# Patient Record
Sex: FEMALE | Race: WHITE | NOT HISPANIC OR LATINO | URBAN - METROPOLITAN AREA
[De-identification: names, ages, dates, MRNs, and addresses within clinical notes are randomized per-mention and may not be internally consistent; named-entity substitution may affect disease eponyms.]

---

## 2023-08-04 PROBLEM — I63.9 ACUTE CVA (CEREBROVASCULAR ACCIDENT) (HCC): Status: ACTIVE | Noted: 2023-08-04

## 2023-08-05 ENCOUNTER — APPOINTMENT (OUTPATIENT)
Dept: RADIOLOGY | Facility: MEDICAL CENTER | Age: 53
DRG: 065 | End: 2023-08-05
Attending: INTERNAL MEDICINE
Payer: COMMERCIAL

## 2023-08-05 ENCOUNTER — APPOINTMENT (OUTPATIENT)
Dept: RADIOLOGY | Facility: MEDICAL CENTER | Age: 53
DRG: 065 | End: 2023-08-05
Attending: PSYCHIATRY & NEUROLOGY
Payer: COMMERCIAL

## 2023-08-05 ENCOUNTER — HOSPITAL ENCOUNTER (INPATIENT)
Facility: MEDICAL CENTER | Age: 53
LOS: 2 days | DRG: 065 | End: 2023-08-07
Attending: STUDENT IN AN ORGANIZED HEALTH CARE EDUCATION/TRAINING PROGRAM | Admitting: STUDENT IN AN ORGANIZED HEALTH CARE EDUCATION/TRAINING PROGRAM
Payer: COMMERCIAL

## 2023-08-05 DIAGNOSIS — I63.9 ACUTE CVA (CEREBROVASCULAR ACCIDENT) (HCC): ICD-10-CM

## 2023-08-05 PROBLEM — E66.9 OBESITY: Status: ACTIVE | Noted: 2023-08-05

## 2023-08-05 PROBLEM — E11.9 TYPE 2 DIABETES MELLITUS (HCC): Status: ACTIVE | Noted: 2023-08-05

## 2023-08-05 PROBLEM — I10 HYPERTENSION: Status: ACTIVE | Noted: 2023-08-05

## 2023-08-05 PROBLEM — E78.5 HYPERLIPIDEMIA: Status: ACTIVE | Noted: 2023-08-05

## 2023-08-05 LAB
AMPHET UR QL SCN: NEGATIVE
ANION GAP SERPL CALC-SCNC: 11 MMOL/L (ref 7–16)
BARBITURATES UR QL SCN: NEGATIVE
BASOPHILS # BLD AUTO: 0.7 % (ref 0–1.8)
BASOPHILS # BLD: 0.07 K/UL (ref 0–0.12)
BENZODIAZ UR QL SCN: NEGATIVE
BUN SERPL-MCNC: 12 MG/DL (ref 8–22)
BZE UR QL SCN: NEGATIVE
CALCIUM SERPL-MCNC: 8.7 MG/DL (ref 8.5–10.5)
CANNABINOIDS UR QL SCN: NEGATIVE
CHLORIDE SERPL-SCNC: 97 MMOL/L (ref 96–112)
CHOLEST SERPL-MCNC: 113 MG/DL (ref 100–199)
CO2 SERPL-SCNC: 28 MMOL/L (ref 20–33)
CREAT SERPL-MCNC: 0.61 MG/DL (ref 0.5–1.4)
EOSINOPHIL # BLD AUTO: 0.22 K/UL (ref 0–0.51)
EOSINOPHIL NFR BLD: 2.2 % (ref 0–6.9)
ERYTHROCYTE [DISTWIDTH] IN BLOOD BY AUTOMATED COUNT: 40.5 FL (ref 35.9–50)
EST. AVERAGE GLUCOSE BLD GHB EST-MCNC: 258 MG/DL
ETHANOL BLD-MCNC: <10.1 MG/DL
FENTANYL UR QL: NEGATIVE
GFR SERPLBLD CREATININE-BSD FMLA CKD-EPI: 107 ML/MIN/1.73 M 2
GLUCOSE BLD STRIP.AUTO-MCNC: 255 MG/DL (ref 65–99)
GLUCOSE BLD STRIP.AUTO-MCNC: 293 MG/DL (ref 65–99)
GLUCOSE BLD STRIP.AUTO-MCNC: 362 MG/DL (ref 65–99)
GLUCOSE BLD STRIP.AUTO-MCNC: 406 MG/DL (ref 65–99)
GLUCOSE SERPL-MCNC: 247 MG/DL (ref 65–99)
HBA1C MFR BLD: 10.6 % (ref 4–5.6)
HCT VFR BLD AUTO: 39.4 % (ref 37–47)
HDLC SERPL-MCNC: 42 MG/DL
HGB BLD-MCNC: 13.7 G/DL (ref 12–16)
IMM GRANULOCYTES # BLD AUTO: 0.03 K/UL (ref 0–0.11)
IMM GRANULOCYTES NFR BLD AUTO: 0.3 % (ref 0–0.9)
LDLC SERPL CALC-MCNC: 55 MG/DL
LYMPHOCYTES # BLD AUTO: 2.9 K/UL (ref 1–4.8)
LYMPHOCYTES NFR BLD: 28.7 % (ref 22–41)
MCH RBC QN AUTO: 30 PG (ref 27–33)
MCHC RBC AUTO-ENTMCNC: 34.8 G/DL (ref 32.2–35.5)
MCV RBC AUTO: 86.2 FL (ref 81.4–97.8)
METHADONE UR QL SCN: NEGATIVE
MONOCYTES # BLD AUTO: 0.68 K/UL (ref 0–0.85)
MONOCYTES NFR BLD AUTO: 6.7 % (ref 0–13.4)
NEUTROPHILS # BLD AUTO: 6.21 K/UL (ref 1.82–7.42)
NEUTROPHILS NFR BLD: 61.4 % (ref 44–72)
NRBC # BLD AUTO: 0 K/UL
NRBC BLD-RTO: 0 /100 WBC (ref 0–0.2)
OPIATES UR QL SCN: NEGATIVE
OXYCODONE UR QL SCN: NEGATIVE
PCP UR QL SCN: NEGATIVE
PLATELET # BLD AUTO: 249 K/UL (ref 164–446)
PMV BLD AUTO: 9.9 FL (ref 9–12.9)
POTASSIUM SERPL-SCNC: 3.4 MMOL/L (ref 3.6–5.5)
PROPOXYPH UR QL SCN: NEGATIVE
RBC # BLD AUTO: 4.57 M/UL (ref 4.2–5.4)
SODIUM SERPL-SCNC: 136 MMOL/L (ref 135–145)
TRIGL SERPL-MCNC: 79 MG/DL (ref 0–149)
WBC # BLD AUTO: 10.1 K/UL (ref 4.8–10.8)

## 2023-08-05 PROCEDURE — A9270 NON-COVERED ITEM OR SERVICE: HCPCS | Performed by: INTERNAL MEDICINE

## 2023-08-05 PROCEDURE — 99222 1ST HOSP IP/OBS MODERATE 55: CPT | Performed by: PSYCHIATRY & NEUROLOGY

## 2023-08-05 PROCEDURE — A9270 NON-COVERED ITEM OR SERVICE: HCPCS | Performed by: NURSE PRACTITIONER

## 2023-08-05 PROCEDURE — 80061 LIPID PANEL: CPT

## 2023-08-05 PROCEDURE — 82077 ASSAY SPEC XCP UR&BREATH IA: CPT

## 2023-08-05 PROCEDURE — 82962 GLUCOSE BLOOD TEST: CPT | Mod: 91

## 2023-08-05 PROCEDURE — 700102 HCHG RX REV CODE 250 W/ 637 OVERRIDE(OP): Performed by: INTERNAL MEDICINE

## 2023-08-05 PROCEDURE — 70551 MRI BRAIN STEM W/O DYE: CPT

## 2023-08-05 PROCEDURE — 770020 HCHG ROOM/CARE - TELE (206)

## 2023-08-05 PROCEDURE — 700102 HCHG RX REV CODE 250 W/ 637 OVERRIDE(OP): Performed by: NURSE PRACTITIONER

## 2023-08-05 PROCEDURE — 80048 BASIC METABOLIC PNL TOTAL CA: CPT

## 2023-08-05 PROCEDURE — 80307 DRUG TEST PRSMV CHEM ANLYZR: CPT

## 2023-08-05 PROCEDURE — 85025 COMPLETE CBC W/AUTO DIFF WBC: CPT

## 2023-08-05 PROCEDURE — 92610 EVALUATE SWALLOWING FUNCTION: CPT

## 2023-08-05 PROCEDURE — 99291 CRITICAL CARE FIRST HOUR: CPT | Performed by: INTERNAL MEDICINE

## 2023-08-05 PROCEDURE — 70544 MR ANGIOGRAPHY HEAD W/O DYE: CPT

## 2023-08-05 PROCEDURE — 700111 HCHG RX REV CODE 636 W/ 250 OVERRIDE (IP): Mod: JZ | Performed by: INTERNAL MEDICINE

## 2023-08-05 PROCEDURE — 70547 MR ANGIOGRAPHY NECK W/O DYE: CPT

## 2023-08-05 PROCEDURE — 83036 HEMOGLOBIN GLYCOSYLATED A1C: CPT

## 2023-08-05 PROCEDURE — A9270 NON-COVERED ITEM OR SERVICE: HCPCS | Mod: JZ | Performed by: INTERNAL MEDICINE

## 2023-08-05 RX ORDER — PROCHLORPERAZINE EDISYLATE 5 MG/ML
5-10 INJECTION INTRAMUSCULAR; INTRAVENOUS EVERY 4 HOURS PRN
Status: DISCONTINUED | OUTPATIENT
Start: 2023-08-05 | End: 2023-08-07 | Stop reason: HOSPADM

## 2023-08-05 RX ORDER — LABETALOL HYDROCHLORIDE 5 MG/ML
10 INJECTION, SOLUTION INTRAVENOUS
Status: DISCONTINUED | OUTPATIENT
Start: 2023-08-05 | End: 2023-08-07 | Stop reason: HOSPADM

## 2023-08-05 RX ORDER — POLYETHYLENE GLYCOL 3350 17 G/17G
1 POWDER, FOR SOLUTION ORAL
Status: DISCONTINUED | OUTPATIENT
Start: 2023-08-05 | End: 2023-08-07 | Stop reason: HOSPADM

## 2023-08-05 RX ORDER — TELMISARTAN 80 MG/1
1 TABLET ORAL DAILY
COMMUNITY

## 2023-08-05 RX ORDER — BISACODYL 10 MG
10 SUPPOSITORY, RECTAL RECTAL
Status: DISCONTINUED | OUTPATIENT
Start: 2023-08-05 | End: 2023-08-07 | Stop reason: HOSPADM

## 2023-08-05 RX ORDER — ATORVASTATIN CALCIUM 80 MG/1
80 TABLET, FILM COATED ORAL EVERY EVENING
Status: DISCONTINUED | OUTPATIENT
Start: 2023-08-05 | End: 2023-08-07 | Stop reason: HOSPADM

## 2023-08-05 RX ORDER — AMOXICILLIN 250 MG
2 CAPSULE ORAL 2 TIMES DAILY
Status: DISCONTINUED | OUTPATIENT
Start: 2023-08-05 | End: 2023-08-07 | Stop reason: HOSPADM

## 2023-08-05 RX ORDER — ACETAMINOPHEN 325 MG/1
650 TABLET ORAL EVERY 6 HOURS PRN
Status: DISCONTINUED | OUTPATIENT
Start: 2023-08-05 | End: 2023-08-07 | Stop reason: HOSPADM

## 2023-08-05 RX ORDER — ASPIRIN 81 MG/1
81 TABLET ORAL DAILY
Status: DISCONTINUED | OUTPATIENT
Start: 2023-08-05 | End: 2023-08-07 | Stop reason: HOSPADM

## 2023-08-05 RX ORDER — PROMETHAZINE HYDROCHLORIDE 25 MG/1
12.5-25 SUPPOSITORY RECTAL EVERY 4 HOURS PRN
Status: DISCONTINUED | OUTPATIENT
Start: 2023-08-05 | End: 2023-08-07 | Stop reason: HOSPADM

## 2023-08-05 RX ORDER — VITAMIN B COMPLEX
2000 TABLET ORAL DAILY
COMMUNITY

## 2023-08-05 RX ORDER — GLIMEPIRIDE 4 MG/1
4 TABLET ORAL
Status: DISCONTINUED | OUTPATIENT
Start: 2023-08-06 | End: 2023-08-07 | Stop reason: HOSPADM

## 2023-08-05 RX ORDER — DEXTROSE MONOHYDRATE 25 G/50ML
25 INJECTION, SOLUTION INTRAVENOUS
Status: DISCONTINUED | OUTPATIENT
Start: 2023-08-05 | End: 2023-08-06

## 2023-08-05 RX ORDER — HYDRALAZINE HYDROCHLORIDE 20 MG/ML
10 INJECTION INTRAMUSCULAR; INTRAVENOUS
Status: DISCONTINUED | OUTPATIENT
Start: 2023-08-05 | End: 2023-08-07 | Stop reason: HOSPADM

## 2023-08-05 RX ORDER — PROMETHAZINE HYDROCHLORIDE 25 MG/1
12.5-25 TABLET ORAL EVERY 4 HOURS PRN
Status: DISCONTINUED | OUTPATIENT
Start: 2023-08-05 | End: 2023-08-07 | Stop reason: HOSPADM

## 2023-08-05 RX ORDER — ENOXAPARIN SODIUM 100 MG/ML
40 INJECTION SUBCUTANEOUS DAILY
Status: DISCONTINUED | OUTPATIENT
Start: 2023-08-05 | End: 2023-08-07 | Stop reason: HOSPADM

## 2023-08-05 RX ORDER — ALPRAZOLAM 1 MG/1
1 TABLET ORAL ONCE
Status: COMPLETED | OUTPATIENT
Start: 2023-08-05 | End: 2023-08-05

## 2023-08-05 RX ORDER — ONDANSETRON 2 MG/ML
4 INJECTION INTRAMUSCULAR; INTRAVENOUS EVERY 4 HOURS PRN
Status: DISCONTINUED | OUTPATIENT
Start: 2023-08-05 | End: 2023-08-07 | Stop reason: HOSPADM

## 2023-08-05 RX ORDER — LEVOTHYROXINE SODIUM 175 UG/1
1 TABLET ORAL
COMMUNITY

## 2023-08-05 RX ORDER — POTASSIUM CHLORIDE 20 MEQ/1
40 TABLET, EXTENDED RELEASE ORAL ONCE
Status: COMPLETED | OUTPATIENT
Start: 2023-08-05 | End: 2023-08-05

## 2023-08-05 RX ORDER — ONDANSETRON 4 MG/1
4 TABLET, ORALLY DISINTEGRATING ORAL EVERY 4 HOURS PRN
Status: DISCONTINUED | OUTPATIENT
Start: 2023-08-05 | End: 2023-08-07 | Stop reason: HOSPADM

## 2023-08-05 RX ADMIN — ENOXAPARIN SODIUM 40 MG: 100 INJECTION SUBCUTANEOUS at 18:00

## 2023-08-05 RX ADMIN — INSULIN HUMAN 5 UNITS: 100 INJECTION, SOLUTION PARENTERAL at 14:30

## 2023-08-05 RX ADMIN — SENNOSIDES AND DOCUSATE SODIUM 2 TABLET: 50; 8.6 TABLET ORAL at 18:00

## 2023-08-05 RX ADMIN — ATORVASTATIN CALCIUM 80 MG: 80 TABLET, FILM COATED ORAL at 17:59

## 2023-08-05 RX ADMIN — POTASSIUM CHLORIDE 40 MEQ: 1500 TABLET, EXTENDED RELEASE ORAL at 09:57

## 2023-08-05 RX ADMIN — ASPIRIN 81 MG: 81 TABLET, COATED ORAL at 18:00

## 2023-08-05 RX ADMIN — INSULIN HUMAN 9 UNITS: 100 INJECTION, SOLUTION PARENTERAL at 18:10

## 2023-08-05 RX ADMIN — ALPRAZOLAM 1 MG: 1 TABLET ORAL at 12:01

## 2023-08-05 RX ADMIN — INSULIN GLARGINE-YFGN 40 UNITS: 100 INJECTION, SOLUTION SUBCUTANEOUS at 18:52

## 2023-08-05 RX ADMIN — INSULIN HUMAN 5 UNITS: 100 INJECTION, SOLUTION PARENTERAL at 05:26

## 2023-08-05 ASSESSMENT — COGNITIVE AND FUNCTIONAL STATUS - GENERAL
SUGGESTED CMS G CODE MODIFIER MOBILITY: CH
MOBILITY SCORE: 24
SUGGESTED CMS G CODE MODIFIER DAILY ACTIVITY: CH
DAILY ACTIVITIY SCORE: 24

## 2023-08-05 ASSESSMENT — LIFESTYLE VARIABLES
CONSUMPTION TOTAL: NEGATIVE
TOTAL SCORE: 0
HOW MANY TIMES IN THE PAST YEAR HAVE YOU HAD 5 OR MORE DRINKS IN A DAY: 0
AVERAGE NUMBER OF DAYS PER WEEK YOU HAVE A DRINK CONTAINING ALCOHOL: 0
DOES PATIENT WANT TO STOP DRINKING: NO
TOTAL SCORE: 0
EVER HAD A DRINK FIRST THING IN THE MORNING TO STEADY YOUR NERVES TO GET RID OF A HANGOVER: NO
ALCOHOL_USE: NO
HAVE PEOPLE ANNOYED YOU BY CRITICIZING YOUR DRINKING: NO
EVER FELT BAD OR GUILTY ABOUT YOUR DRINKING: NO
TOTAL SCORE: 0
HAVE YOU EVER FELT YOU SHOULD CUT DOWN ON YOUR DRINKING: NO
ON A TYPICAL DAY WHEN YOU DRINK ALCOHOL HOW MANY DRINKS DO YOU HAVE: 0

## 2023-08-05 ASSESSMENT — PAIN DESCRIPTION - PAIN TYPE
TYPE: ACUTE PAIN

## 2023-08-05 ASSESSMENT — PATIENT HEALTH QUESTIONNAIRE - PHQ9
SUM OF ALL RESPONSES TO PHQ9 QUESTIONS 1 AND 2: 0
2. FEELING DOWN, DEPRESSED, IRRITABLE, OR HOPELESS: NOT AT ALL
1. LITTLE INTEREST OR PLEASURE IN DOING THINGS: NOT AT ALL

## 2023-08-05 ASSESSMENT — ENCOUNTER SYMPTOMS
SENSORY CHANGE: 1
STRIDOR: 0
SHORTNESS OF BREATH: 0
VOMITING: 0
BLURRED VISION: 1
MYALGIAS: 0
FOCAL WEAKNESS: 1
DIZZINESS: 0
COUGH: 0
NAUSEA: 0
FEVER: 0
HEADACHES: 1
ABDOMINAL PAIN: 0
CHILLS: 0
SPUTUM PRODUCTION: 0

## 2023-08-05 NOTE — THERAPY
Speech Language Pathology   Clinical Swallow Evaluation     Patient Name: Robyn Schaefer  AGE:  53 y.o., SEX:  female  Medical Record #: [id number]  Date of Service: 8/5/2023      History of Present Illness  52 y/o F admitted 8/5/23 with R sided weakness.  A noncontrast CT of the head was obtained which showed no hemorrhage however a CTA was unable to be obtained due to her allergy to contrast.  Telemetry neurology was contacted and gave her an NIH stroke scale of 4 for right upper and lower extremity weakness, right hand ataxia and sensation changes on the right.  She was given TNK and transferred to our facility for poststroke care.  At this time the patient states her sensation changes have resolved as has her discoordination of the right hand and right lower extremity weakness.    PMHx: HTN, HLD, DM-II.     General Information:  Vitals  O2 (LPM): 2  O2 Delivery Device: Silicone Nasal Cannula  Level of Consciousness: Alert  Patient Behaviors:  (Calm, cooperative)  Orientation: Oriented x 4  Follows Directives: Yes    Prior Living Situation & Level of Function:  Prior Services: Home-Independent  Communication: WFL  Swallowing: WFL     Oral Mechanism Evaluation:  Dentition: Good   Facial Symmetry: Equal  Facial Sensation: Equal     Labial Observations: WFL   Lingual Observations: Midline  Motor Speech: WFL       Laryngeal Function:  Secretion Management: Adequate  Voice Quality: WFL  Cough: Perceptually WNL    Subjective  Patient reports her R hand movement has improved. She states she had been having some word finding issues over the last couple months but nothing significant during this stroke-like episode. She denies any difficulty swallowing.    Assessment  Current Method of Nutrition: NPO until cleared by speech pathology  Positioning: Nguyen's (60-90 degrees)  Bolus Administration: Patient  O2 (LPM): 2 O2 Delivery Device: Silicone Nasal Cannula  Factor(s) Affecting Performance: None     Swallowing  Trials:  Swallowing Trials  Thin Liquid (TN0): WFL  Pureed (PU4): WFL  Regular (RG7): WFL    Comments: Intact oral containment, mastication, bolus formation and oral clearance. Pharyngeal swallow response appeared timely. No s/sx of pharyngeal inefficiency or airway invasion. Patient denied any difficulty swallowing.     Clinical Impressions  Intact oropharyngeal swallow. Diet modification is not indicated. No further SLP intervention is indicated for swallowing. Will f/u to determine if formal speech/cog eval is indicated based on MRI results, though pt appears cognitively intact based on informal observations.     Recommendations  Diet Consistency: Regular/thin  Instrumentation: None indicated at this time  Medication: Whole with liquid  Supervision: Independent  Positioning: Fully upright and midline during oral intake  Risk Management : None  Oral Care: BID    SLP Treatment Plan  Treatment Plan: None Indicated  SLP Frequency: N/A - Evaluation Only  Estimated Duration: N/A - Evaluation Only    Anticipated Discharge Needs  Discharge Recommendations: Anticipate that the patient will have no further speech therapy needs after discharge from the hospital   Therapy Recommendations Upon DC: Not Indicated     Flor Manzano MS,CCC-SLP

## 2023-08-05 NOTE — DISCHARGE PLANNING
[facility] Transitional Care Coordination    Referral from: Dr. Ronnie Guzmán Jr  Insurance Provider on Facesheet:  No insurance noted at this time  Potential Rehab diagnosis:  Stroke    Chart review indicates patient has ongoing medical management and may have therapy needs to possibly meet inpatient rehab facility criteria with the goal of returning to community.      D/C Support: TBD     Physiatry consult pended, waiting for additional information.  Right hand weakness,  right visual changes.  Received TNK.  Workup ongoing - CT head, MR brain pending.  PT/OT pending as clinically appropriate.  TCC will follow.  Please reach out sooner if PMR consult requested for medical management.     Thank you for the referral.

## 2023-08-05 NOTE — H&P
"Critical Care H&P    Date of consult: 8/5/2023    Referring Physician  Maico Chavez M.D.    Reason for Consultation  Right-sided weakness    History of Presenting Illness  53 y.o. female with past medical history of hypertension, hyperlipidemia, poorly controlled type 2 diabetes who presented 8/5/2023 as a transfer from [facility] where she presented with right hand weakness.  The patient states she got into her car and was unable to  the stick shift.  At that time (1530) she noted a frontal headache and right visual field blurriness, \"like water\".  She drove herself home then decided to go to the ER, prior to arrival in the ER her headache and visual symptoms resolved however she was having ongoing right hand weakness.  A noncontrast CT of the head was obtained which showed no hemorrhage however a CTA was unable to be obtained due to her allergy to contrast.  Telemetry neurology was contacted and gave her an NIH stroke scale of 4 for right upper and lower extremity weakness, right hand ataxia and sensation changes on the right.  She was given TNK and transferred to our facility for poststroke care.  At this time the patient states her sensation changes have resolved as has her discoordination of the right hand and right lower extremity weakness.    She denies any history of migraine, stroke, TIA or irregular heart rhythm.    Code Status  Full Code    Review of Systems  Review of Systems   Constitutional:  Negative for chills and fever.   Eyes:  Positive for blurred vision.   Respiratory:  Negative for cough, sputum production, shortness of breath and stridor.    Cardiovascular:  Positive for chest pain (On and off for months, currently chest pain-free).   Gastrointestinal:  Negative for abdominal pain, nausea and vomiting.   Genitourinary:  Negative for dysuria.   Musculoskeletal:  Negative for myalgias.   Skin:  Negative for rash.   Neurological:  Positive for sensory change, focal " weakness and headaches. Negative for dizziness.       Past Medical History  HTN, HLP, type 2 diabetes    Surgical History   has no past surgical history on file.    Family History  family history is not on file.    Social History  Quit tobacco 15 yrs ago, quit meth 20 years ago, no EtOH    Medications  Home Medications    **Home medications have not yet been reviewed for this encounter**       Current Facility-Administered Medications   Medication Dose Route Frequency Provider Last Rate Last Admin    senna-docusate (Pericolace Or Senokot S) 8.6-50 MG per tablet 2 Tablet  2 Tablet Oral BID Ronnie Guzmán Jr., D.O.        And    polyethylene glycol/lytes (Miralax) PACKET 1 Packet  1 Packet Oral QDAY PRN Ronnie Guzmán Jr., D.O.        And    magnesium hydroxide (Milk Of Magnesia) suspension 30 mL  30 mL Oral QDAY PRN Ronnie Guzmán Jr., D.O.        And    bisacodyl (Dulcolax) suppository 10 mg  10 mg Rectal QDAY PRN Ronnie Guzmán Jr., D.O.        acetaminophen (Tylenol) tablet 650 mg  650 mg Oral Q6HRS PRN Ronnie Guzmán Jr., D.O.        ondansetron (Zofran) syringe/vial injection 4 mg  4 mg Intravenous Q4HRS PRN Ronnie Guzmán Jr., D.O.        ondansetron (Zofran ODT) dispertab 4 mg  4 mg Oral Q4HRS PRN Ronnie Guzmán Jr., D.O.        promethazine (Phenergan) tablet 12.5-25 mg  12.5-25 mg Oral Q4HRS PRN Ronnie Guzmán Jr., D.O.        promethazine (Phenergan) suppository 12.5-25 mg  12.5-25 mg Rectal Q4HRS PRN Ronnie Guzmán Jr., D.O.        prochlorperazine (Compazine) injection 5-10 mg  5-10 mg Intravenous Q4HRS PRN Ronnie Guzmán Jr., D.O.        insulin regular (HumuLIN R,NovoLIN R) injection  2-9 Units Subcutaneous Q6HRS Ronnie Guzmán Jr., D.O.        And    dextrose 50% (D50W) injection 25 g  25 g Intravenous Q15 MIN PRN Ronnie Guzmán Jr., D.O.        labetalol (Normodyne/Trandate) injection 10 mg  10 mg Intravenous Q10 MIN PRN Ronnie Guzmán Jr., D.O.        hydrALAZINE (Apresoline) injection  10 mg  10 mg Intravenous Q2HRS PRN ADRIENNE Shaw Jr.O.        niCARdipine (Cardene) 25 mg in  mL Standard Infusion  0-15 mg/hr Intravenous Continuous Ronnie Guzmán Jr., D.O.        atorvastatin (Lipitor) tablet 80 mg  80 mg Oral Q EVENING DUONG Shaw Jr..GINGER.           Allergies  No Known Allergies    Vital Signs last 24 hours       Physical Exam  Physical Exam  Vitals and nursing note reviewed.   Constitutional:       Appearance: She is obese. She is ill-appearing.   HENT:      Head: Normocephalic and atraumatic.      Right Ear: External ear normal.      Left Ear: External ear normal.      Nose: Nose normal.      Mouth/Throat:      Mouth: Mucous membranes are moist.      Pharynx: Oropharynx is clear.   Eyes:      Extraocular Movements: Extraocular movements intact.      Conjunctiva/sclera: Conjunctivae normal.      Pupils: Pupils are equal, round, and reactive to light.   Cardiovascular:      Rate and Rhythm: Normal rate and regular rhythm.      Pulses: Normal pulses.   Pulmonary:      Effort: Pulmonary effort is normal.      Breath sounds: Normal breath sounds.   Abdominal:      General: Bowel sounds are normal.      Palpations: Abdomen is soft.   Musculoskeletal:         General: Normal range of motion.      Cervical back: Neck supple.   Skin:     General: Skin is warm and dry.      Capillary Refill: Capillary refill takes less than 2 seconds.   Neurological:      Mental Status: She is alert.      Cranial Nerves: No cranial nerve deficit.      Sensory: No sensory deficit.      Motor: Weakness present.      Comments: 4+ out of 5 right upper extremity strength.  All others 5 out of 5.  Intact sensation and cranial nerves.   Psychiatric:         Mood and Affect: Mood normal.         Behavior: Behavior normal.         Fluids  No intake or output data in the 24 hours ending 08/05/23 0214    Laboratory  No results found for this or any previous visit (from the past 48 hour(s)).    Imaging  No orders  to display       Assessment/Plan  * Acute CVA (cerebrovascular accident) (HCC)- (present on admission)  Assessment & Plan  Differential diagnosis includes complex migraine, TIA versus CVA  NIH improved post TNK  Neurology consulted  Admit to ICU, cardiac monitoring  Neurology consult  Neuro checks per protocol  MRI, Echo pending  seizure, aspiration, and fall precautions   NPO pending dysphagia screen  SLP/PT/OT evaluation  lipid panel, HbA1c, Troponin, Coags, routine labs  ASA/Heparin 24 hours post tPA, Atorvastatin  Maintain eunatremia, euglycemia, normothermia and normotension  Goal SBP <180 mmHg; hydralazine, labetalol nicardipine as needed    Obesity- (present on admission)  Assessment & Plan  Weight loss counseling prior to discharge    Hypertension- (present on admission)  Assessment & Plan  Goal systolic blood pressure less than 180  As needed medications available  Restart home meds when swallow evaluation passed    Hyperlipidemia- (present on admission)  Assessment & Plan  High intensity statin  Check lipid panel    Type 2 diabetes mellitus (HCC)- (present on admission)  Assessment & Plan  Hold home metformin  Goal blood glucose 140-180  sliding scale insulin, accuchecks  hypoglycemia protocol  Check A1c          Discussed patient condition and risk of morbidity and/or mortality with RN, RT, Pharmacy, Code status disscussed, Charge nurse / hot rounds, and Patient.      The patient remains critically ill.  Critical care time = 35 minutes in directly providing and coordinating critical care and extensive data review.  No time overlap and excludes procedures.    Please note that this dictation was created using voice recognition software. The accuracy of the dictation is limited to the abilities of the software. I have made every reasonable attempt to correct obvious errors, but I expect that there are errors of grammar and possibly content that I did not discover before finalizing the note.

## 2023-08-05 NOTE — PROGRESS NOTES
Late Entry:  Dr. Guzmán at the bedside, plan of care discussed admission, and post TNK orders orders received.

## 2023-08-05 NOTE — CONSULTS
Neurology STROKE CODE H&P  Neurohospitalist Service, [facility]    Referring Physician: Maico Chavez M.D.    STROKE CODE: No chief complaint on file.      To obtain the most accurate data regarding the time called, and time patient seen, refer to the stroke run-sheet and chart.  For time of CT, refer to the radiology report. See A&P below for TPA Decision and door to needle time if and when applicable.    HPI: 53-year-old female with a past medical history of hyperlipidemia, diabetes presented to outside facility with a cute onset of right arm hand numbness and weakness and clumsiness.  Episode of visual changes.  The time she arrived to the facility visual changes resolved however there right arm changes persisted.  Telemetry neurology at that facility was consulted and TNK was given.  TNK was given at 1740.  Onset was 1530.  Patient now says most of her weakness has resolved still has slight numbness in her right hand.  NIH reportedly was 4 outside facility      Review of systems: In addition to what is detailed in the HPI above, (and scanned into the chart if and when applicable), all other systems reviewed and are negative.    Past Medical History:   Diabetes and hyperlipidemia  FHx:  No early strokes  SHx:       Allergies:  No Known Allergies    Medications:    Current Facility-Administered Medications:     senna-docusate (Pericolace Or Senokot S) 8.6-50 MG per tablet 2 Tablet, 2 Tablet, Oral, BID **AND** polyethylene glycol/lytes (Miralax) PACKET 1 Packet, 1 Packet, Oral, QDAY PRN **AND** magnesium hydroxide (Milk Of Magnesia) suspension 30 mL, 30 mL, Oral, QDAY PRN **AND** bisacodyl (Dulcolax) suppository 10 mg, 10 mg, Rectal, QDAY PRN, Ronnie Guzmán Jr., D.O.    acetaminophen (Tylenol) tablet 650 mg, 650 mg, Oral, Q6HRS PRN, Ronnie Guzmán Jr., D.O.    ondansetron (Zofran) syringe/vial injection 4 mg, 4 mg, Intravenous, Q4HRS PRN, Ronnie Guzmán Jr., D.O.    ondansetron (Zofran  ODT) dispertab 4 mg, 4 mg, Oral, Q4HRS PRN, DUONG Shaw Jr..OAlexia    promethazine (Phenergan) tablet 12.5-25 mg, 12.5-25 mg, Oral, Q4HRS PRN, DUONG Shaw Jr..O.    promethazine (Phenergan) suppository 12.5-25 mg, 12.5-25 mg, Rectal, Q4HRS PRN, DUONG Shaw Jr..O.    prochlorperazine (Compazine) injection 5-10 mg, 5-10 mg, Intravenous, Q4HRS PRN, DUONG Shaw Jr..O.    insulin regular (HumuLIN R,NovoLIN R) injection, 2-9 Units, Subcutaneous, Q6HRS, 5 Units at 08/05/23 0526 **AND** POC blood glucose manual result, , , Q6H **AND** NOTIFY MD and PharmD, , , Once **AND** Administer 20 grams of glucose (approximately 8 ounces of fruit juice) every 15 minutes PRN FSBG less than 70 mg/dL, , , PRN **AND** dextrose 50% (D50W) injection 25 g, 25 g, Intravenous, Q15 MIN PRN, DUONG Shaw Jr..O.    labetalol (Normodyne/Trandate) injection 10 mg, 10 mg, Intravenous, Q10 MIN PRN, DUONG Shaw Jr..O.    hydrALAZINE (Apresoline) injection 10 mg, 10 mg, Intravenous, Q2HRS PRN, DUONG Shaw Jr..O.    niCARdipine (Cardene) 25 mg in  mL Standard Infusion, 0-15 mg/hr, Intravenous, Continuous, DUONG Shaw Jr..O., Dose not Required at 08/05/23 0230    atorvastatin (Lipitor) tablet 80 mg, 80 mg, Oral, Q EVENING, Ronnie Guzmán Jr. D.O.    Physical Examination:    Vitals:    08/05/23 0400 08/05/23 0500 08/05/23 0600 08/05/23 0700   BP: 125/59 126/65 125/62 108/59   Pulse: 73 74 70 71   Resp: 15 13 14 13   Temp: 36.3 °C (97.4 °F) 36.4 °C (97.6 °F) 36.4 °C (97.6 °F)    TempSrc: Temporal      SpO2: 96% 97% 96% 96%   Weight:       Height:           General: Patient is awake and in no acute distress  Eyes: Cannot visualize fundus CV: RRR    NEUROLOGICAL EXAM:     Mental status: Awake, alert and fully oriented, follows commands  Speech and language: speech is clear and fluent. The patient is able to name and repeat.  Cranial nerve exam: Pupils are equal, round and reactive to light  bilaterally. Visual fields are full. Extraocular muscles are intact. Sensation in the face is intact to light touch. Face is symmetric. Hearing intact. Palate elevates symmetrically. Shoulder shrug is full. Tongue is midline.  Motor exam: Sustained antigravity and in all 4.  Slight right hand  weakness 4 out of 5..  Sensory exam: Slight decreased to soft touch in the right hand compared to left.  Deep tendon reflexes: Toes are downgoing bilaterally coordination: no ataxia   Gait: deferred due to being on bedbound orders after TNK    NIH Stroke Scale:    1a. Level of Consciousness (Alert, drowsy, etc): 0= Alert    1b. LOC Questions (Month, age): 0= Answers both correctly    1c. LOC Commands (Open/close eyes make fist/let go): 0= Obeys both correctly    2.   Best Gaze (Eyes open - patient follows examiner's finger on face): 0= Normal    3.   Visual Fields (introduce visual stimulus/threat to patient's field quadrants): 0= No visual loss  4.   Facial Paresis (Show teeth, raise eyebrows and squeeze eyes shut): 0= Normal     5a. Motor Arm - Left (Elevate arm to 90 degrees if patient is sitting, 45 degrees if  supine): 0= No drift    5b. Motor Arm - Right (Elevate arm to 90 degrees if patient is sitting, 45 degrees if supine): 0= No drift    6a. Motor Leg - Left (Elevate leg 30 degrees with patient supine): 0= No drift    6b. Motor Leg - Right  (Elevate leg 30 degrees with patient supine): 0= No drift    7.   Limb Ataxia (Finger-nose, heel down shin): 0= No ataxia    8.   Sensory (Pin prick to face, arm, trunk and leg - compare side to side): 1= Partial loss    9.  Best Language (Name item, describe a picture and read sentences): 0= No aphasia    10. Dysarthria (Evaluate speech clarity by patient repeating listed words): 0= Normal articulation    11. Extinction and Inattention (Use information from prior testing to identify neglect or  double simultaneous stimuli testing): 0= No neglect    Total NIH Score:  1    Modified [place] Scale (MRS): 0 = No symptoms      Objective Data:    Labs:  No results found for: PROTHROMBTM, INR   Lab Results   Component Value Date/Time    WBC 10.1 08/05/2023 03:45 AM    RBC 4.57 08/05/2023 03:45 AM    HEMOGLOBIN 13.7 08/05/2023 03:45 AM    HEMATOCRIT 39.4 08/05/2023 03:45 AM    MCV 86.2 08/05/2023 03:45 AM    MCH 30.0 08/05/2023 03:45 AM    MCHC 34.8 08/05/2023 03:45 AM    MPV 9.9 08/05/2023 03:45 AM    NEUTSPOLYS 61.40 08/05/2023 03:45 AM    LYMPHOCYTES 28.70 08/05/2023 03:45 AM    MONOCYTES 6.70 08/05/2023 03:45 AM    EOSINOPHILS 2.20 08/05/2023 03:45 AM    BASOPHILS 0.70 08/05/2023 03:45 AM      Lab Results   Component Value Date/Time    SODIUM 136 08/05/2023 03:45 AM    POTASSIUM 3.4 (L) 08/05/2023 03:45 AM    CHLORIDE 97 08/05/2023 03:45 AM    CO2 28 08/05/2023 03:45 AM    GLUCOSE 247 (H) 08/05/2023 03:45 AM    BUN 12 08/05/2023 03:45 AM    CREATININE 0.61 08/05/2023 03:45 AM      Lab Results   Component Value Date/Time    CHOLSTRLTOT 113 08/05/2023 03:45 AM    LDL 55 08/05/2023 03:45 AM    HDL 42 08/05/2023 03:45 AM    TRIGLYCERIDE 79 08/05/2023 03:45 AM       No results found for: ALKPHOSPHAT, ASTSGOT, ALTSGPT, TBILIRUBIN     Imaging/Testing:  MR-MRA HEAD-W/O    (Results Pending)   MR-MRA NECK-W/O    (Results Pending)         Assessment and Plan:    53-year-old female transferred from our facility after TNK.  Patient had acute onset of right arm weakness and numbness with ataxia.  Symptoms have improved greatly after TNK.  Patient  had lacunar infarct in the thalamic region.  We will get MRI brain.  TTE.  Control secondary risk factors.    Plan:  1.  MRI brain, MRA head and neck.  2.  Plan to initiate aspirin therapy this afternoon  3.  Maintain blood pressure below 180 systolic.  4.  Continue Lipitor for an LDL goal below 70.  Currently 55  5.  Maintain normoglycemia, A1c is 10.6  6.  TTE with bubble  7.  Telemetry monitoring  8.  PT/OT/speech        The evaluation of the patient,  and recommended management, was discussed with the resident staff.     This chart was partially generated using voice recognition technology and sound alike word replacement may be present, best efforts were made to make the chart accurate.    Milton Cali MD  Board Certified Neurology, ABPN  t) [phone]

## 2023-08-05 NOTE — CARE PLAN
The patient is Stable - Low risk of patient condition declining or worsening    Shift Goals  Clinical Goals: stable neuro exam, imaging  Patient Goals: rest  Family Goals: maggie    Progress made toward(s) clinical / shift goals: Neuro exams stable s/p TNK, imaging done. SPL eval passed.      Problem: Optimal Care of the Stroke Patient  Goal: Optimal acute care for the stroke patient  Outcome: Progressing    Problem: Discharge Planning - Stroke  Goal: Ensure Stroke Core Measures are met prior to discharge  Outcome: Progressing     Problem: Neuro Status  Goal: Neuro status will remain stable or improve  Outcome: Progressing

## 2023-08-05 NOTE — PROGRESS NOTES
Neuro update MRI results      MRI reveals small scattered infarcts over the left hemisphere.  Normal MRA head and neck.  High suspicious for cardioembolic phenomenon.  Continue with telemetry monitoring.  Okay to initiate antiplatelet therapy this afternoon.  Okay to transfer out of the ICU this afternoon.

## 2023-08-05 NOTE — ASSESSMENT & PLAN NOTE
Goal systolic blood pressure less than 180  As needed medications available  Restart home meds when swallow evaluation passed

## 2023-08-05 NOTE — CONSULTS
[facility] INTENSIVIST DIRECT ADMISSION REPORT    Transferring facility: in    Chief complaint: CVA    Pertinent history & patient course: 54yo F with HTN, HLD, T2DM presenting with acute onset right hand weakness and right visual changes.  NIH 1.  Visual changes resolved prior to arrival at ED.  Given TNK at 1740 on  8/4 and will be transferred for monitoring.      Pertinent imaging & lab results: CT head WNL      Code Status: not discussed per transferring provider, I personally verified with the transferring provider patient's code status and the transferring provider has confirmed this with the patient.    Further work up or recommendations prior to transfer: na    Consultants called prior to transfer and pertinent input from consultants: neurology notified prior to ICU    Patient accepted for transfer: yes  Consultants to be called upon arrival: Neurology - can see tomorrow  Floor requested: RICU  ADT order placed for accepted patient: yes    Please inform the Intensivist upon assignment of an ICU bed and then again on arrival of the patient.

## 2023-08-05 NOTE — ASSESSMENT & PLAN NOTE
Differential diagnosis includes complex migraine, TIA versus CVA  NIH improved post TNK  Neurology consulted  Admit to ICU, cardiac monitoring  Neurology consult  Neuro checks per protocol  MRI, Echo pending  seizure, aspiration, and fall precautions   NPO pending dysphagia screen  SLP/PT/OT evaluation  lipid panel, HbA1c, Troponin, Coags, routine labs  ASA/Heparin 24 hours post tPA, Atorvastatin  Maintain eunatremia, euglycemia, normothermia and normotension  Goal SBP <180 mmHg; hydralazine, labetalol nicardipine as needed

## 2023-08-05 NOTE — PROGRESS NOTES
RCC    Seen and examined  EHR reviewed  Case reviewed with Dr Guzmán    Right-sided weakness and visual changes mostly resolved  NIHSS 1  TNK yesterday at 1740    Examined with neurology at the bedside, minimal abnormal findings  Iodine allergy and no CTA done initially    Blood pressure controlled  Echo pending    Statin initiated and lipid panel drawn  Glycohemoglobin pending    MRA without abnormality  MRI Several punctate foci of acute infarction involving the cortex in the left posterior frontal and parietal lobes, no hemorrhagic transformation    Add aspirin 81 mg  Continue BP control per protocols  Lovenox PPx    BS trending up  Will resume Long acting Insulin  Continue SSI  Give 5 units IV x one  Resume Amnayana    D/w RN and Dr James cruz transfer    Will transfer out of ICU 24 hours post TNK    Time spent 45 minutes

## 2023-08-05 NOTE — ASSESSMENT & PLAN NOTE
Hold home metformin  Goal blood glucose 140-180  sliding scale insulin, accuchecks  hypoglycemia protocol  Check A1c

## 2023-08-05 NOTE — PROGRESS NOTES
Med rec partial per patient at bedside.  Patient unsure of the names or strengths of all of her medications. Unable to verify medications with patient's home pharmacy, [facility] in [place], [place] ([phone]), as the pharmacy is closed on the weekend; pharmacy will be open Monday at 08:00.  Allergies reviewed with patient.  Patient denies outpatient antibiotic usage within the last 30 days.

## 2023-08-06 ENCOUNTER — APPOINTMENT (OUTPATIENT)
Dept: RADIOLOGY | Facility: MEDICAL CENTER | Age: 53
DRG: 065 | End: 2023-08-06
Attending: INTERNAL MEDICINE
Payer: COMMERCIAL

## 2023-08-06 ENCOUNTER — APPOINTMENT (OUTPATIENT)
Dept: CARDIOLOGY | Facility: MEDICAL CENTER | Age: 53
DRG: 065 | End: 2023-08-06
Attending: INTERNAL MEDICINE
Payer: COMMERCIAL

## 2023-08-06 LAB
ANION GAP SERPL CALC-SCNC: 6 MMOL/L (ref 7–16)
BASOPHILS # BLD AUTO: 0.7 % (ref 0–1.8)
BASOPHILS # BLD: 0.06 K/UL (ref 0–0.12)
BUN SERPL-MCNC: 15 MG/DL (ref 8–22)
CALCIUM SERPL-MCNC: 9.2 MG/DL (ref 8.5–10.5)
CHLORIDE SERPL-SCNC: 98 MMOL/L (ref 96–112)
CO2 SERPL-SCNC: 31 MMOL/L (ref 20–33)
CREAT SERPL-MCNC: 0.82 MG/DL (ref 0.5–1.4)
EOSINOPHIL # BLD AUTO: 0.32 K/UL (ref 0–0.51)
EOSINOPHIL NFR BLD: 3.9 % (ref 0–6.9)
ERYTHROCYTE [DISTWIDTH] IN BLOOD BY AUTOMATED COUNT: 41.1 FL (ref 35.9–50)
GFR SERPLBLD CREATININE-BSD FMLA CKD-EPI: 86 ML/MIN/1.73 M 2
GLUCOSE BLD STRIP.AUTO-MCNC: 102 MG/DL (ref 65–99)
GLUCOSE BLD STRIP.AUTO-MCNC: 125 MG/DL (ref 65–99)
GLUCOSE BLD STRIP.AUTO-MCNC: 267 MG/DL (ref 65–99)
GLUCOSE BLD STRIP.AUTO-MCNC: 300 MG/DL (ref 65–99)
GLUCOSE BLD STRIP.AUTO-MCNC: 312 MG/DL (ref 65–99)
GLUCOSE BLD STRIP.AUTO-MCNC: 358 MG/DL (ref 65–99)
GLUCOSE SERPL-MCNC: 357 MG/DL (ref 65–99)
HCT VFR BLD AUTO: 41.6 % (ref 37–47)
HGB BLD-MCNC: 14 G/DL (ref 12–16)
IMM GRANULOCYTES # BLD AUTO: 0.03 K/UL (ref 0–0.11)
IMM GRANULOCYTES NFR BLD AUTO: 0.4 % (ref 0–0.9)
LV EJECT FRACT  99904: 55
LYMPHOCYTES # BLD AUTO: 2.74 K/UL (ref 1–4.8)
LYMPHOCYTES NFR BLD: 33.5 % (ref 22–41)
MCH RBC QN AUTO: 29.4 PG (ref 27–33)
MCHC RBC AUTO-ENTMCNC: 33.7 G/DL (ref 32.2–35.5)
MCV RBC AUTO: 87.4 FL (ref 81.4–97.8)
MONOCYTES # BLD AUTO: 0.68 K/UL (ref 0–0.85)
MONOCYTES NFR BLD AUTO: 8.3 % (ref 0–13.4)
NEUTROPHILS # BLD AUTO: 4.36 K/UL (ref 1.82–7.42)
NEUTROPHILS NFR BLD: 53.2 % (ref 44–72)
NRBC # BLD AUTO: 0 K/UL
NRBC BLD-RTO: 0 /100 WBC (ref 0–0.2)
PLATELET # BLD AUTO: 265 K/UL (ref 164–446)
PMV BLD AUTO: 10 FL (ref 9–12.9)
POTASSIUM SERPL-SCNC: 3.9 MMOL/L (ref 3.6–5.5)
RBC # BLD AUTO: 4.76 M/UL (ref 4.2–5.4)
SODIUM SERPL-SCNC: 135 MMOL/L (ref 135–145)
WBC # BLD AUTO: 8.2 K/UL (ref 4.8–10.8)

## 2023-08-06 PROCEDURE — 700102 HCHG RX REV CODE 250 W/ 637 OVERRIDE(OP): Performed by: INTERNAL MEDICINE

## 2023-08-06 PROCEDURE — 97166 OT EVAL MOD COMPLEX 45 MIN: CPT

## 2023-08-06 PROCEDURE — A9270 NON-COVERED ITEM OR SERVICE: HCPCS | Performed by: INTERNAL MEDICINE

## 2023-08-06 PROCEDURE — 93306 TTE W/DOPPLER COMPLETE: CPT

## 2023-08-06 PROCEDURE — 82962 GLUCOSE BLOOD TEST: CPT | Mod: 91

## 2023-08-06 PROCEDURE — 700111 HCHG RX REV CODE 636 W/ 250 OVERRIDE (IP): Mod: JZ | Performed by: INTERNAL MEDICINE

## 2023-08-06 PROCEDURE — 70450 CT HEAD/BRAIN W/O DYE: CPT

## 2023-08-06 PROCEDURE — 99233 SBSQ HOSP IP/OBS HIGH 50: CPT | Performed by: PSYCHIATRY & NEUROLOGY

## 2023-08-06 PROCEDURE — 770020 HCHG ROOM/CARE - TELE (206)

## 2023-08-06 PROCEDURE — 80048 BASIC METABOLIC PNL TOTAL CA: CPT

## 2023-08-06 PROCEDURE — 97161 PT EVAL LOW COMPLEX 20 MIN: CPT

## 2023-08-06 PROCEDURE — 85025 COMPLETE CBC W/AUTO DIFF WBC: CPT

## 2023-08-06 PROCEDURE — 99223 1ST HOSP IP/OBS HIGH 75: CPT | Performed by: INTERNAL MEDICINE

## 2023-08-06 PROCEDURE — 93306 TTE W/DOPPLER COMPLETE: CPT | Mod: 26 | Performed by: INTERNAL MEDICINE

## 2023-08-06 PROCEDURE — 700117 HCHG RX CONTRAST REV CODE 255: Performed by: INTERNAL MEDICINE

## 2023-08-06 RX ORDER — DEXTROSE MONOHYDRATE 25 G/50ML
25 INJECTION, SOLUTION INTRAVENOUS
Status: DISCONTINUED | OUTPATIENT
Start: 2023-08-06 | End: 2023-08-07 | Stop reason: HOSPADM

## 2023-08-06 RX ORDER — DEXTROSE MONOHYDRATE 25 G/50ML
25 INJECTION, SOLUTION INTRAVENOUS
Status: DISCONTINUED | OUTPATIENT
Start: 2023-08-06 | End: 2023-08-06

## 2023-08-06 RX ORDER — IPRATROPIUM BROMIDE AND ALBUTEROL SULFATE 2.5; .5 MG/3ML; MG/3ML
3 SOLUTION RESPIRATORY (INHALATION)
Status: DISCONTINUED | OUTPATIENT
Start: 2023-08-06 | End: 2023-08-07 | Stop reason: HOSPADM

## 2023-08-06 RX ADMIN — SENNOSIDES AND DOCUSATE SODIUM 2 TABLET: 50; 8.6 TABLET ORAL at 17:20

## 2023-08-06 RX ADMIN — POLYETHYLENE GLYCOL 3350 1 PACKET: 17 POWDER, FOR SOLUTION ORAL at 17:17

## 2023-08-06 RX ADMIN — ENOXAPARIN SODIUM 40 MG: 100 INJECTION SUBCUTANEOUS at 17:19

## 2023-08-06 RX ADMIN — ASPIRIN 81 MG: 81 TABLET, COATED ORAL at 05:42

## 2023-08-06 RX ADMIN — INSULIN GLARGINE-YFGN 40 UNITS: 100 INJECTION, SOLUTION SUBCUTANEOUS at 05:51

## 2023-08-06 RX ADMIN — ACETAMINOPHEN 650 MG: 325 TABLET, FILM COATED ORAL at 11:50

## 2023-08-06 RX ADMIN — GLIMEPIRIDE 4 MG: 4 TABLET ORAL at 08:19

## 2023-08-06 RX ADMIN — INSULIN HUMAN 6 UNITS: 100 INJECTION, SOLUTION PARENTERAL at 00:09

## 2023-08-06 RX ADMIN — SENNOSIDES AND DOCUSATE SODIUM 2 TABLET: 50; 8.6 TABLET ORAL at 05:42

## 2023-08-06 RX ADMIN — HUMAN ALBUMIN MICROSPHERES AND PERFLUTREN 3 ML: 10; .22 INJECTION, SOLUTION INTRAVENOUS at 13:15

## 2023-08-06 RX ADMIN — ATORVASTATIN CALCIUM 80 MG: 80 TABLET, FILM COATED ORAL at 17:20

## 2023-08-06 RX ADMIN — INSULIN HUMAN 5 UNITS: 100 INJECTION, SOLUTION PARENTERAL at 05:50

## 2023-08-06 ASSESSMENT — ACTIVITIES OF DAILY LIVING (ADL): TOILETING: INDEPENDENT

## 2023-08-06 ASSESSMENT — ENCOUNTER SYMPTOMS
BLOOD IN STOOL: 0
MYALGIAS: 0
COUGH: 0
CHILLS: 0
SORE THROAT: 0
ABDOMINAL PAIN: 0
BLURRED VISION: 1
DIARRHEA: 0
FLANK PAIN: 0
BRUISES/BLEEDS EASILY: 0
PALPITATIONS: 0
DIZZINESS: 0
SHORTNESS OF BREATH: 0
BACK PAIN: 0
WHEEZING: 0
NECK PAIN: 0
SPUTUM PRODUCTION: 0
SEIZURES: 0
FOCAL WEAKNESS: 1
VOMITING: 0
DIAPHORESIS: 0
FEVER: 0
HEADACHES: 0
NAUSEA: 0

## 2023-08-06 ASSESSMENT — COGNITIVE AND FUNCTIONAL STATUS - GENERAL
DAILY ACTIVITIY SCORE: 24
SUGGESTED CMS G CODE MODIFIER MOBILITY: CH
MOBILITY SCORE: 24
SUGGESTED CMS G CODE MODIFIER DAILY ACTIVITY: CH

## 2023-08-06 ASSESSMENT — GAIT ASSESSMENTS
GAIT LEVEL OF ASSIST: SUPERVISED
DISTANCE (FEET): 300
DEVIATION: INCREASED BASE OF SUPPORT

## 2023-08-06 ASSESSMENT — PAIN DESCRIPTION - PAIN TYPE
TYPE: ACUTE PAIN
TYPE: ACUTE PAIN

## 2023-08-06 NOTE — PROGRESS NOTES
Patient arrived on the unit, accompanied by the nursing team. No signs of distress or discomfort are observed.    Assessments continued and NIHSS completed. No new complications are observed. Will continue to monitor and re-assess.

## 2023-08-06 NOTE — ASSESSMENT & PLAN NOTE
Patient is status post tPA   continuous cardiac monitoring to evaluate for any arrhythmias  Q4 hours neuro checks  MRI brain reveals several punctate foci of acute infarction involving the cortex in the left posterior frontal and parietal lobes, MRA head and neck negative for LVO  Check 2-D echo with bubble study  Patient will be started on full dose aspirin and high intensity statin per neurology's recommendations  Strict blood pressure and glycemic control  PTOT and ST evaluation

## 2023-08-06 NOTE — THERAPY
Occupational Therapy   Initial Evaluation     Patient Name: Robyn Schaefer  Age:  53 y.o., Sex:  female  Medical Record #: [id number]  Today's Date: 8/6/2023     Precautions  Precautions: Fall Risk    Assessment    Patient is 53 y.o. female admitted for right arm weakness/numbness and vision changes found to have acute CVA s/p tNK; PMH HTN, obesity, HLD. Pt normally independent with functional mobility and ADLs living in a H with mother and step father, working full time in IT. Pt able to complete functional mobility and ADLs with supervision, no AD required. Pt complained of 1st/2nd finger numbness but otherwise no symptoms or deficits noted. Anticipate pt is at or near her functional baseline, no acute OT needs identified.      Plan    DC Equipment Recommendations: (P) None  Discharge Recommendations: (P) Anticipate that the patient will have no further occupational therapy needs after discharge from the hospital     Objective       08/06/23 1408   Prior Living Situation   Prior Services None   Housing / Facility Mobile Home   Steps Into Home 5   Rail Both Rail (Steps into Home)   Bathroom Set up Walk In Shower   Equipment Owned None   Lives with - Patient's Self Care Capacity Alone and Able to Care For Self   Comments parents currently staying with patient   Prior Level of ADL Function   Self Feeding Independent   Grooming / Hygiene Independent   Bathing Independent   Dressing Independent   Toileting Independent   Prior Level of IADL Function   Medication Management Independent   Laundry Independent   Kitchen Mobility Independent   Finances Independent   Home Management Independent   Shopping Independent   Prior Level Of Mobility Independent Without Device in Community   Driving / Transportation Driving Independent   Occupation (Pre-Hospital Vocational) Employed Full Time   History of Falls   History of Falls No   Pain 0 - 10 Group   Therapist Pain Assessment Post Activity Pain Same as Prior to Activity;Nurse  Notified   Cognition    Cognition / Consciousness WDL   Level of Consciousness Alert   Comments Pleasant, cooperative, receptive to therapy   Active ROM Upper Body   Active ROM Upper Body  WDL   Dominant Hand Right   Strength Upper Body   Upper Body Strength  WDL   Sensation Upper Body   Upper Extremity Sensation  WDL   Upper Body Muscle Tone   Upper Body Muscle Tone  WDL   Neurological Concerns   Neurological Concerns No   Coordination Upper Body   Coordination WDL   Balance Assessment   Sitting Balance (Static) Normal   Sitting Balance (Dynamic) Normal   Standing Balance (Static) Normal   Standing Balance (Dynamic) Normal   Weight Shift Sitting Normal   Weight Shift Standing Normal   Comments no AD   Bed Mobility    Scooting Supervised   Comments up at EOB pre, left in chair after   ADL Assessment   Grooming Supervision   Upper Body Dressing Supervision   Lower Body Dressing Supervision   How much help from another person does the patient currently need...   Putting on and taking off regular lower body clothing? 4   Bathing (including washing, rinsing, and drying)? 4   Toileting, which includes using a toilet, bedpan, or urinal? 4   Putting on and taking off regular upper body clothing? 4   Taking care of personal grooming such as brushing teeth? 4   Eating meals? 4   6 Clicks Daily Activity Score 24   mRS Prior to admission   Prior to admission mRS 0   Modified [place] (mRS)   Modified [place] Score 1   Functional Mobility   Sit to Stand Supervised   Bed, Chair, Wheelchair Transfer Supervised   Transfer Method Stand Step   Mobility bed mobility, hallway, left seated in chair   Comments no AD   Visual Perception   Visual Perception  WDL   Activity Tolerance   Sitting in Chair left setaed in chair   Standing 10 min   Education Group   Education Provided Role of Occupational Therapist   Role of Occupational Therapist Patient Response Patient;Acceptance;Explanation   Problem List   Problem List None   Anticipated  Discharge Equipment and Recommendations   DC Equipment Recommendations None   Discharge Recommendations Anticipate that the patient will have no further occupational therapy needs after discharge from the hospital   Interdisciplinary Plan of Care Collaboration   IDT Collaboration with  Physical Therapist   Patient Position at End of Therapy Seated;Chair Alarm On;Call Light within Reach;Tray Table within Reach;Phone within Reach   Collaboration Comments RN updated

## 2023-08-06 NOTE — CONSULTS
Hospital Medicine Consultation    Date of Service  8/6/2023    Referring Physician  Armando Somers M.D.    Consulting Physician  Armando Somers M.D.    Reason for Consultation  Management of medical comorbidities    History of Presenting Illness  53 y.o. female with a past medical history of hypertension, hyperlipidemia, type 2 diabetes mellitus who presented 8/5/2023 with right upper extremity weakness.  Patient developed sudden onset of headache and right upper extremity weakness with right blurred vision around 1530 on 8/4/2023.  She presented to an outlying facility where a CT of the head was negative for acute hemorrhage.  Telemetry neurology was consulted and patient was given TNK and transferred to our facility where she was admitted to the ICU for close hemodynamic monitoring.  Patient underwent MRI brain that revealed several punctate foci of acute infarction involving the cortex in the left posterior frontal and parietal lobes.  MRA head and neck were negative for large vessel occlusion.  2D echo is pending at this time.  Patient's hemoglobin A1c returned elevated at 10.6.  Her LDL is 55.  Neurology was consulted and the patient was started on aspirin and atorvastatin.  He has noted improvement of her symptoms.  Continue PT OT and speech therapy.  CT head interpreted by me reveals no acute intracranial hemorrhage.    Review of Systems  Review of Systems   Constitutional:  Negative for chills, diaphoresis and fever.   HENT:  Negative for hearing loss and sore throat.    Eyes:  Positive for blurred vision.   Respiratory:  Negative for cough, sputum production, shortness of breath and wheezing.    Cardiovascular:  Negative for chest pain, palpitations and leg swelling.   Gastrointestinal:  Negative for abdominal pain, blood in stool, diarrhea, nausea and vomiting.   Genitourinary:  Negative for dysuria, flank pain and urgency.   Musculoskeletal:  Negative for back pain, joint pain, myalgias and neck pain.   Skin:   Negative for rash.   Neurological:  Positive for focal weakness. Negative for dizziness, seizures and headaches.   Endo/Heme/Allergies:  Does not bruise/bleed easily.   Psychiatric/Behavioral:  Negative for suicidal ideas.    All other systems reviewed and are negative.      Past Medical History  Hypertension, diabetes mellitus    Surgical History  No pertinent surgical history    Family History  No pertinent family history    Social History   reports that she has never smoked. She has never been exposed to tobacco smoke. She has never used smokeless tobacco. She reports that she does not drink alcohol and does not use drugs.    Medications  Prior to Admission Medications   Prescriptions Last Dose Informant Patient Reported? Taking?   Levothyroxine Sodium (SYNTHROID PO) 8/4/2023 at 0830 Patient Yes Yes   Sig: Take 1 Tablet by mouth every morning on an empty stomach.   Telmisartan (MICARDIS PO) 8/4/2023 at 0830 Patient Yes Yes   Sig: Take 1 Tablet by mouth every day.   aspirin 81 MG tablet 8/4/2023 at 0830 Patient Yes No   Sig: Take 81 mg by mouth every day.   glimepiride (AMARYL) 4 MG Tab 8/4/2023 at 0830 Patient Yes No   Sig: Take 4 mg by mouth 2 times a day.   insulin detemir (LEVEMIR) 100 UNIT/ML injection PEN 8/4/2023 at 0830 Patient Yes No   Sig: Inject 40 Units under the skin 2 times a day.   liraglutide (VICTOZA) 18 MG/3ML Solution Pen-injector injection 8/4/2023 at 0830 Patient Yes No   Sig: Inject 0.6 mg as instructed every day.   metformin (GLUCOPHAGE) 500 MG Tab 8/4/2023 at 0830 Patient Yes No   Sig: Take 500 mg by mouth 2 times a day, with meals.   vitamin D3 (CHOLECALCIFEROL) 1000 Unit (25 mcg) Tab 8/4/2023 at 0830 Patient Yes Yes   Sig: Take 2,000 Units by mouth every day. 2 tablets = 2,000 units.      Facility-Administered Medications: None       Allergies  Allergies   Allergen Reactions    Iodine Contrast [Diagnostic X-Ray Materials] Hives       Physical Exam  Temp:  [36.1 °C (96.9 °F)-36.4 °C (97.6  °F)] 36.2 °C (97.1 °F)  Pulse:  [63-92] 80  Resp:  [16-25] 17  BP: (100-143)/(57-79) 117/68  SpO2:  [94 %-98 %] 96 %    Physical Exam  Vitals and nursing note reviewed.   Constitutional:       General: She is not in acute distress.     Appearance: Normal appearance.   HENT:      Head: Normocephalic and atraumatic.      Nose: Nose normal.      Mouth/Throat:      Mouth: Mucous membranes are moist.   Eyes:      Extraocular Movements: Extraocular movements intact.      Conjunctiva/sclera: Conjunctivae normal.      Pupils: Pupils are equal, round, and reactive to light.   Cardiovascular:      Rate and Rhythm: Normal rate and regular rhythm.      Pulses: Normal pulses.      Heart sounds: Normal heart sounds.   Pulmonary:      Effort: Pulmonary effort is normal. No respiratory distress.      Breath sounds: Normal breath sounds. No wheezing, rhonchi or rales.   Abdominal:      General: Bowel sounds are normal. There is no distension.      Palpations: Abdomen is soft.      Tenderness: There is no abdominal tenderness.   Musculoskeletal:         General: No swelling or tenderness. Normal range of motion.      Cervical back: Normal range of motion and neck supple.   Lymphadenopathy:      Cervical: No cervical adenopathy.   Skin:     General: Skin is warm.      Coloration: Skin is not jaundiced.      Findings: No rash.   Neurological:      General: No focal deficit present.      Mental Status: She is alert and oriented to person, place, and time.      Cranial Nerves: No cranial nerve deficit.      Motor: No weakness.   Psychiatric:         Mood and Affect: Mood normal.         Behavior: Behavior normal.         Fluids      Laboratory  Recent Labs     08/05/23  0345 08/06/23  0120   WBC 10.1 8.2   RBC 4.57 4.76   HEMOGLOBIN 13.7 14.0   HEMATOCRIT 39.4 41.6   MCV 86.2 87.4   MCH 30.0 29.4   MCHC 34.8 33.7   RDW 40.5 41.1   PLATELETCT 249 265   MPV 9.9 10.0     Recent Labs     08/05/23  0345 08/06/23  0120   SODIUM 136 135    POTASSIUM 3.4* 3.9   CHLORIDE 97 98   CO2 28 31   GLUCOSE 247* 357*   BUN 12 15   CREATININE 0.61 0.82   CALCIUM 8.7 9.2              Recent Labs     08/05/23  0345   TRIGLYCERIDE 79   HDL 42   LDL 55        Imaging  CT-HEAD W/O   Final Result      No acute process.         MR-MRA NECK-W/O   Final Result      1.  Normal MR angiogram of the carotid arteries and vertebral basilar system..      MR-MRA HEAD-W/O   Final Result         MRA OF THE [place] OF BRAVO WITHIN NORMAL LIMITS.      MR-BRAIN-W/O   Final Result      1.  Several punctate foci of acute infarction involving the cortex in the left posterior frontal and parietal lobes      MR-BRAIN-W/O    (Results Pending)   EC-ECHOCARDIOGRAM COMPLETE W/O CONT    (Results Pending)       Assessment/Plan  * Acute CVA (cerebrovascular accident) (HCC)- (present on admission)  Assessment & Plan  Patient is status post tPA   continuous cardiac monitoring to evaluate for any arrhythmias  Q4 hours neuro checks  MRI brain reveals several punctate foci of acute infarction involving the cortex in the left posterior frontal and parietal lobes, MRA head and neck negative for LVO  Check 2-D echo with bubble study  Patient will be started on full dose aspirin and high intensity statin per neurology's recommendations  Strict blood pressure and glycemic control  PTOT and ST evaluation        Obesity- (present on admission)  Assessment & Plan  Body mass index is 39.95 kg/m².  Recommended outpatient weight management program and bariatric surgery evaluation      Hypertension- (present on admission)  Assessment & Plan    Hydralazine as needed    Hyperlipidemia- (present on admission)  Assessment & Plan  Continue Lipitor    Type 2 diabetes mellitus (HCC)- (present on admission)  Assessment & Plan  Uncontrolled  insulin sliding scale with serial Accu-Checks  Increase Lantus to 45 units twice daily  Hypoglycemic protocol in place

## 2023-08-06 NOTE — CARE PLAN
The patient is Stable - Low risk of patient condition declining or worsening    Shift Goals  Clinical Goals: Stable neuro status  Patient Goals: Comfort  Family Goals: N/A    Progress made toward(s) clinical / shift goals:      Problem: Neuro Status  Goal: Neuro status will remain stable or improve  Outcome: Progressing  Note: Q4h neuro checks.     Problem: Psychosocial - Patient Condition  Goal: Patient's ability to verbalize feelings about condition will improve  Outcome: Progressing     Problem: Knowledge Deficit - Stroke Education  Goal: Patient's knowledge of stroke and risk factors will improve  Outcome: Progressing       Patient is not progressing towards the following goals:

## 2023-08-06 NOTE — PROGRESS NOTES
Neurology Progress Note  Neurohospitalist Service, [facility]    Referring Physician: Armando Somers M.D.    No chief complaint on file.      HPI: Refer to initial documented Neurology H&P, as detailed in the patient's chart.    Interval History 8/6: No new events overnight.      Medications:    Current Facility-Administered Medications:     senna-docusate (Pericolace Or Senokot S) 8.6-50 MG per tablet 2 Tablet, 2 Tablet, Oral, BID, 2 Tablet at 08/06/23 0542 **AND** polyethylene glycol/lytes (Miralax) PACKET 1 Packet, 1 Packet, Oral, QDAY PRN **AND** magnesium hydroxide (Milk Of Magnesia) suspension 30 mL, 30 mL, Oral, QDAY PRN **AND** bisacodyl (Dulcolax) suppository 10 mg, 10 mg, Rectal, QDAY PRN, Ronnie Guzmán Jr., D.O.    acetaminophen (Tylenol) tablet 650 mg, 650 mg, Oral, Q6HRS PRN, Ronnie Guzmán Jr., D.O.    ondansetron (Zofran) syringe/vial injection 4 mg, 4 mg, Intravenous, Q4HRS PRN, Ronnie Guzmán Jr., D.O.    ondansetron (Zofran ODT) dispertab 4 mg, 4 mg, Oral, Q4HRS PRN, Ronnie Guzmán Jr., D.O.    promethazine (Phenergan) tablet 12.5-25 mg, 12.5-25 mg, Oral, Q4HRS PRN, Ronnie Guzmán Jr., D.O.    promethazine (Phenergan) suppository 12.5-25 mg, 12.5-25 mg, Rectal, Q4HRS PRN, Ronnie Guzmán Jr., D.O.    prochlorperazine (Compazine) injection 5-10 mg, 5-10 mg, Intravenous, Q4HRS PRN, Ronnie Guzmán Jr., D.O.    insulin regular (HumuLIN R,NovoLIN R) injection, 2-9 Units, Subcutaneous, Q6HRS, 5 Units at 08/06/23 0550 **AND** POC blood glucose manual result, , , Q6H **AND** NOTIFY MD and PharmD, , , Once **AND** Administer 20 grams of glucose (approximately 8 ounces of fruit juice) every 15 minutes PRN FSBG less than 70 mg/dL, , , PRN **AND** dextrose 50% (D50W) injection 25 g, 25 g, Intravenous, Q15 MIN PRN, Ronnie Guzmán Jr., D.O.    labetalol (Normodyne/Trandate) injection 10 mg, 10 mg, Intravenous, Q10 MIN PRN, Ronnie Guzmán Jr., D.O.    hydrALAZINE (Apresoline)  injection 10 mg, 10 mg, Intravenous, Q2HRS PRN, DUONG Shaw Jr..O.    niCARdipine (Cardene) 25 mg in  mL Standard Infusion, 0-15 mg/hr, Intravenous, Continuous, DUONG Shaw Jr..O., Dose not Required at 08/05/23 0230    atorvastatin (Lipitor) tablet 80 mg, 80 mg, Oral, Q EVENING, ADRIENNE Shaw Jr.OAlexia, 80 mg at 08/05/23 1759    aspirin EC tablet 81 mg, 81 mg, Oral, DAILY, Leonard Reyez M.D., 81 mg at 08/06/23 0542    enoxaparin (Lovenox) inj 40 mg, 40 mg, Subcutaneous, DAILY AT 1800, Leonard Reyez M.D., 40 mg at 08/05/23 1800    insulin GLARGINE (Lantus,Semglee) injection, 40 Units, Subcutaneous, BID, Leonard Reyez M.D., 40 Units at 08/06/23 0551    glimepiride (Amaryl) tablet 4 mg, 4 mg, Oral, QAM AC, Leonard Reyez M.D., 4 mg at 08/06/23 0819    Physical Examination:     Vitals:    08/05/23 2307 08/06/23 0321 08/06/23 0800 08/06/23 0839   BP: 100/58 117/74 117/68    Pulse: 72 71 63 80   Resp: 18 18 17    Temp: 36.1 °C (97 °F) 36.1 °C (96.9 °F) 36.2 °C (97.1 °F)    TempSrc: Temporal Temporal Temporal    SpO2: 96% 95% 98% 96%   Weight:       Height:               NEUROLOGICAL EXAM:     Patient is awake and alert x4.  Speech is intact.  Cranials 2-12 are intact.  Sustained antigravity and in all 4.  Sensation mostly intact slight decrease to soft touch in the right hand.  Normal finger-to-nose testing  Reflexes are diminished with downgoing toes laterally.  Gait normal.    Objective Data:    Labs:  No results found for: PROTHROMBTM, INR   Lab Results   Component Value Date/Time    WBC 8.2 08/06/2023 01:20 AM    RBC 4.76 08/06/2023 01:20 AM    HEMOGLOBIN 14.0 08/06/2023 01:20 AM    HEMATOCRIT 41.6 08/06/2023 01:20 AM    MCV 87.4 08/06/2023 01:20 AM    MCH 29.4 08/06/2023 01:20 AM    MCHC 33.7 08/06/2023 01:20 AM    MPV 10.0 08/06/2023 01:20 AM    NEUTSPOLYS 53.20 08/06/2023 01:20 AM    LYMPHOCYTES 33.50 08/06/2023 01:20 AM    MONOCYTES 8.30 08/06/2023 01:20 AM    EOSINOPHILS  3.90 08/06/2023 01:20 AM    BASOPHILS 0.70 08/06/2023 01:20 AM      Lab Results   Component Value Date/Time    SODIUM 135 08/06/2023 01:20 AM    POTASSIUM 3.9 08/06/2023 01:20 AM    CHLORIDE 98 08/06/2023 01:20 AM    CO2 31 08/06/2023 01:20 AM    GLUCOSE 357 (H) 08/06/2023 01:20 AM    BUN 15 08/06/2023 01:20 AM    CREATININE 0.82 08/06/2023 01:20 AM      Lab Results   Component Value Date/Time    CHOLSTRLTOT 113 08/05/2023 03:45 AM    LDL 55 08/05/2023 03:45 AM    HDL 42 08/05/2023 03:45 AM    TRIGLYCERIDE 79 08/05/2023 03:45 AM       No results found for: ALKPHOSPHAT, ASTSGOT, ALTSGPT, TBILIRUBIN     Imaging/Testing:  CT-HEAD W/O   Final Result      No acute process.         MR-MRA NECK-W/O   Final Result      1.  Normal MR angiogram of the carotid arteries and vertebral basilar system..      MR-MRA HEAD-W/O   Final Result         MRA OF THE [place] OF BRAVO WITHIN NORMAL LIMITS.      MR-BRAIN-W/O   Final Result      1.  Several punctate foci of acute infarction involving the cortex in the left posterior frontal and parietal lobes      MR-BRAIN-W/O    (Results Pending)   EC-ECHOCARDIOGRAM COMPLETE W/O CONT    (Results Pending)         Assessment and Plan:    53-year-old female transferred on facility after TNK.  Patient presenting with right arm weakness and numbness.  Symptoms have now mostly resolved.  MRI brain shows scattered infarct over the left hemisphere.  Normal MRA head and neck.  Etiology of the infarcts unknown.  However patient does have a few uncontrolled risk factors including diabetes and obesity.  Waiting on TTE.  If this is unremarkable patient can be discharged home.  Follow-up in stroke clinic.  She will need long-term cardiac monitoring.  Zio patch at discharge and most likely a referral to cardiology for longer monitoring if this is unremarkable.    Plan:  1.  Continue aspirin 81 mg daily  2.  Long-term blood pressure goal should be normotensive.  No acute strict parameters necessary at this  time.  3.  Continue statin for LDL below 70.    4.  Maintain normoglycemia  5.  TTE  6.  Continue telemetry monitoring  7.  Zio patch at discharge  8.  Follow-up in stroke Bridge clinic    Neurology will sign off please call back if having additional questions.  We will peripheral follow-up for the JOEY results    Spent a total of 56 minutes reviewing the MRIs with the patient bedside: The care plan including most likely able to go home tomorrow after TTE and follow-up with neurology in stroke Bridge clinic and most likely to cardiologist.    This chart was partially generated using voice recognition technology and sound alike word replacement may be present, best efforts were made to make the chart accurate.    Milton Cali MD  Board Certified Neurology, ABPN  t) [phone]

## 2023-08-06 NOTE — ASSESSMENT & PLAN NOTE
Body mass index is 39.95 kg/m².  Recommended outpatient weight management program and bariatric surgery evaluation

## 2023-08-06 NOTE — PROGRESS NOTES
Bedside report given to Hermilo MURDOCK. All questions answered, NIH completed at bedside. ALL patient belongings accounted for

## 2023-08-06 NOTE — ASSESSMENT & PLAN NOTE
Uncontrolled  insulin sliding scale with serial Accu-Checks  Increase Lantus to 45 units twice daily  Hypoglycemic protocol in place

## 2023-08-06 NOTE — THERAPY
"Physical Therapy   Initial Evaluation     Patient Name: Robyn Schaefer  Age:  53 y.o., Sex:  female  Medical Record #: [id number]  Today's Date: 8/6/2023          Assessment  Patient is a 53 y.o. female admitted with c/o vision changes (since resolved), and RUE weakness. Per neurology note, MRI showing \"scattered infarcts over L hemisphere.\" Pt seen for PT evaluation at this time. Pt reports feeling back to baseline and completed mobility without assist, ambulated without AD, no LOB, able to negotiate steps without difficulty. BLE strength, sensation, coordination intact. Pt reports no concerns with return home and appears functionally capable of DC at this time. No further acute PT needs, encouraged amb with nursing.     Plan  Evaluation only  DC Equipment Recommendations: None  Discharge Recommendations: Anticipate that the patient will have no further physical therapy needs after discharge from the hospital     08/06/23 1407   Prior Living Situation   Prior Services None   Housing / Facility Mobile Home   Steps Into Home 5   Rail Both Rail (Steps into Home)   Equipment Owned None   Comments parents are currently staying with her but will be leaving in a month, pt typically indep and working   Prior Level of Functional Mobility   Bed Mobility Independent   Transfer Status Independent   Ambulation Independent   Ambulation Distance community distances   Assistive Devices Used None   Stairs Independent   Cognition    Level of Consciousness Alert   Comments pleasant and motivated   Balance Assessment   Sitting Balance (Static) Normal   Sitting Balance (Dynamic) Normal   Standing Balance (Static) Normal   Standing Balance (Dynamic) Normal   Weight Shift Sitting Normal   Weight Shift Standing Normal   Comments no AD   Bed Mobility    Scooting Supervised   Comments NT, up pre/post   Gait Analysis   Gait Level Of Assist Supervised   Assistive Device None   Distance (Feet) 300   Deviation Increased Base Of Support   # of " Stairs Climbed 5   Level of Assist with Stairs Supervised   Weight Bearing Status no restrictions   Comments no LOB   Functional Mobility   Sit to Stand Supervised   Bed, Chair, Wheelchair Transfer Supervised

## 2023-08-07 ENCOUNTER — HOSPITAL ENCOUNTER (INPATIENT)
Dept: RADIOLOGY | Facility: MEDICAL CENTER | Age: 53
DRG: 065 | End: 2023-08-07
Attending: INTERNAL MEDICINE | Admitting: STUDENT IN AN ORGANIZED HEALTH CARE EDUCATION/TRAINING PROGRAM
Payer: COMMERCIAL

## 2023-08-07 VITALS
SYSTOLIC BLOOD PRESSURE: 122 MMHG | WEIGHT: 240.08 LBS | OXYGEN SATURATION: 96 % | HEART RATE: 79 BPM | DIASTOLIC BLOOD PRESSURE: 68 MMHG | HEIGHT: 65 IN | RESPIRATION RATE: 17 BRPM | TEMPERATURE: 97 F | BODY MASS INDEX: 40 KG/M2

## 2023-08-07 LAB
ANION GAP SERPL CALC-SCNC: 6 MMOL/L (ref 7–16)
BASOPHILS # BLD AUTO: 0.8 % (ref 0–1.8)
BASOPHILS # BLD: 0.08 K/UL (ref 0–0.12)
BUN SERPL-MCNC: 12 MG/DL (ref 8–22)
CALCIUM SERPL-MCNC: 9.2 MG/DL (ref 8.5–10.5)
CHLORIDE SERPL-SCNC: 99 MMOL/L (ref 96–112)
CO2 SERPL-SCNC: 32 MMOL/L (ref 20–33)
CREAT SERPL-MCNC: 0.64 MG/DL (ref 0.5–1.4)
EOSINOPHIL # BLD AUTO: 0.18 K/UL (ref 0–0.51)
EOSINOPHIL NFR BLD: 1.8 % (ref 0–6.9)
ERYTHROCYTE [DISTWIDTH] IN BLOOD BY AUTOMATED COUNT: 40.3 FL (ref 35.9–50)
GFR SERPLBLD CREATININE-BSD FMLA CKD-EPI: 106 ML/MIN/1.73 M 2
GLUCOSE SERPL-MCNC: 175 MG/DL (ref 65–99)
HCT VFR BLD AUTO: 41.3 % (ref 37–47)
HGB BLD-MCNC: 14.1 G/DL (ref 12–16)
IMM GRANULOCYTES # BLD AUTO: 0.04 K/UL (ref 0–0.11)
IMM GRANULOCYTES NFR BLD AUTO: 0.4 % (ref 0–0.9)
LYMPHOCYTES # BLD AUTO: 2.66 K/UL (ref 1–4.8)
LYMPHOCYTES NFR BLD: 26.9 % (ref 22–41)
MCH RBC QN AUTO: 29.7 PG (ref 27–33)
MCHC RBC AUTO-ENTMCNC: 34.1 G/DL (ref 32.2–35.5)
MCV RBC AUTO: 86.9 FL (ref 81.4–97.8)
MONOCYTES # BLD AUTO: 0.62 K/UL (ref 0–0.85)
MONOCYTES NFR BLD AUTO: 6.3 % (ref 0–13.4)
NEUTROPHILS # BLD AUTO: 6.32 K/UL (ref 1.82–7.42)
NEUTROPHILS NFR BLD: 63.8 % (ref 44–72)
NRBC # BLD AUTO: 0 K/UL
NRBC BLD-RTO: 0 /100 WBC (ref 0–0.2)
PLATELET # BLD AUTO: 267 K/UL (ref 164–446)
PMV BLD AUTO: 10.1 FL (ref 9–12.9)
POTASSIUM SERPL-SCNC: 4 MMOL/L (ref 3.6–5.5)
RBC # BLD AUTO: 4.75 M/UL (ref 4.2–5.4)
SODIUM SERPL-SCNC: 137 MMOL/L (ref 135–145)
WBC # BLD AUTO: 9.9 K/UL (ref 4.8–10.8)

## 2023-08-07 PROCEDURE — A9270 NON-COVERED ITEM OR SERVICE: HCPCS | Performed by: INTERNAL MEDICINE

## 2023-08-07 PROCEDURE — 700102 HCHG RX REV CODE 250 W/ 637 OVERRIDE(OP): Performed by: INTERNAL MEDICINE

## 2023-08-07 PROCEDURE — 80048 BASIC METABOLIC PNL TOTAL CA: CPT

## 2023-08-07 PROCEDURE — 82962 GLUCOSE BLOOD TEST: CPT

## 2023-08-07 PROCEDURE — 99239 HOSP IP/OBS DSCHRG MGMT >30: CPT | Performed by: INTERNAL MEDICINE

## 2023-08-07 PROCEDURE — 85025 COMPLETE CBC W/AUTO DIFF WBC: CPT

## 2023-08-07 RX ORDER — ATORVASTATIN CALCIUM 40 MG/1
40 TABLET, FILM COATED ORAL EVERY EVENING
Qty: 30 TABLET | Refills: 0 | Status: SHIPPED | OUTPATIENT
Start: 2023-08-07

## 2023-08-07 RX ADMIN — SENNOSIDES AND DOCUSATE SODIUM 2 TABLET: 50; 8.6 TABLET ORAL at 08:19

## 2023-08-07 RX ADMIN — GLIMEPIRIDE 4 MG: 4 TABLET ORAL at 08:19

## 2023-08-07 RX ADMIN — ASPIRIN 81 MG: 81 TABLET, COATED ORAL at 08:19

## 2023-08-07 ASSESSMENT — PAIN DESCRIPTION - PAIN TYPE: TYPE: ACUTE PAIN

## 2023-08-07 NOTE — PROGRESS NOTES
2324-pt was feeling hot and anxious like her blood sugars were dropping, pt requesting to have her sugars checked; BG-102, given 4oz apple juice, a cold washcloth and ice pack

## 2023-08-07 NOTE — PROGRESS NOTES
Pt requests to shower before d/c  Will apply zio patch when out of shower.   L/M for stroke bridge clinic regarding follow up for pt

## 2023-08-07 NOTE — PROGRESS NOTES
Med rec complete, followed up with pharmacy on dosages of levothyroxine and telmisartan. Pharmacist notified.

## 2023-08-07 NOTE — DISCHARGE INSTRUCTIONS
"Ischemic Stroke  Discharge Instructions    You experienced an Ischemic Stroke.  Ischemic stroke is the most common type of stroke and happens when an artery in the brain becomes blocked by a plaque fragment or blood clot. Typically, these blockages travel from the heart or larger arteries that supply the brain.  The brain needs a constant supply of blood, which carries oxygen and nutrients it needs to function.  A stroke occurs when one of these arteries to the brain is either blocked or bursts. As a result, part of the brain does not get the blood it needs, so it starts to die.     Thrombolytic Treatment for Ischemic Stroke    You received thrombolytic treatment for an Ischemic Stroke. Thrombolytics are medicines that can break up blood clots. These medicines help to treat a stroke when given as soon as possible after stroke symptoms start.  The medicine was given to you through an IV tube.  In some cases, the medicine may go to the affected area through a thin tube (catheter). This tube is usually put in at the top of your leg.    Get help right away if:  You have blood in your vomit, pee, or poop.  You have a serious fall or accident, or you hit your head.  You have symptoms of an allergy to the medicine, such as a rash or trouble breathing.  You have other signs of a stroke, such as:  A sudden, very bad headache with no known cause.  Feeling like you may vomit (nausea).  Vomiting.  A seizure    Stroke Risk Factors    You are at increased risk of having another stroke event.  See your Patient Stroke Guide to help reduce your stroke risk. These are your specific risk factors:  Age - Over 55  Diabetes  Heart disease  High blood pressure  High Cholesterol and lipids     Get help right away if you have any signs of a stroke.  \"BE FAST\" is an easy way to remember the main warning signs of a stroke:  B - Balance. Dizziness, sudden trouble walking, or loss of balance.  E - Eyes. Trouble seeing or a change in how you " see.  F - Face. Sudden weakness or loss of feeling in the face. The face or eyelid may droop on one side.  A - Arms. Weakness or loss of feeling in an arm. This happens all of a sudden and most often on one side of the body.  S - Speech. Sudden trouble speaking, slurred speech, or trouble understanding what people say.  T - Time. Time to call emergency services. Write down what time symptoms started.

## 2023-08-07 NOTE — DISCHARGE PLANNING
Robyn is not requiring 2 of 3 disciplines.  TCC will no longer follow.  Please reach out to myself with any interval changes/questions.

## 2023-08-08 LAB — GLUCOSE BLD STRIP.AUTO-MCNC: 207 MG/DL (ref 65–99)

## 2023-08-08 NOTE — DOCUMENTATION QUERY
[facility]                                                                       Query Response Note      PATIENT:               RAFI MICHELLE  ACCT #:                  [id number]  MRN:                     [id number]  :                      1970  ADMIT DATE:       2023 12:00 AM  DISCH DATE:          RESPONDING  PROVIDER #:        [id number]           QUERY TEXT:    Please clarify in documentation the relationship, if any, between CVA and right-sided weakness.    NOTE: If an appropriate response is not listed below, please respond with a new note.    Thank you.        The patient's Clinical Indicators include:   H&P: Right-sided weakness.   Hospitalist Note: Acute CVA, status past tPA.    Risk factor: CVA    Treatment: tPA    Thank you,  Karina Smith  Clinical   Connect via [employer]  Options provided:   -- Right-sided weakness is due to CVA   -- Right-sided weakness is not due to CVA   -- Other explanation, Please specify   -- Unable to determine      Query created by: Karina Smith on 2023 10:21 AM    RESPONSE TEXT:    Right-sided weakness is due to CVA          Electronically signed by:  TIA BROWN MD 2023 5:23 PM

## 2023-08-08 NOTE — DISCHARGE SUMMARY
Discharge Summary    CHIEF COMPLAINT ON ADMISSION  No chief complaint on file.      Reason for Admission  acute stroke     Admission Date  8/5/2023    CODE STATUS  Prior    HPI & HOSPITAL COURSE  53 y.o. female with a past medical history of hypertension, hyperlipidemia, type 2 diabetes mellitus who presented 8/5/2023 with right upper extremity weakness.  Patient developed sudden onset of headache and right upper extremity weakness with right blurred vision around 1530 on 8/4/2023.  She presented to an outlying facility where a CT of the head was negative for acute hemorrhage.  Telemetry neurology was consulted and patient was given TNK and transferred to our facility where she was admitted to the ICU for close hemodynamic monitoring.  Patient underwent MRI brain that revealed several punctate foci of acute infarction involving the cortex in the left posterior frontal and parietal lobes.  MRA head and neck were negative for large vessel occlusion.  2D echo was within normal limits without shunt.  Patient's hemoglobin A1c returned elevated at 10.6.  Her LDL is 55.  Neurology was consulted and the patient was started on aspirin and atorvastatin.  His Zio patch was placed.  She has noted improvement of her symptoms.  She was evaluated by PT OT and ST and did not require any postacute therapies.  Patient is instructed to follow-up with [facility]    Therefore, she is discharged in good and stable condition to home with close outpatient follow-up.    The patient met 2-midnight criteria for an inpatient stay at the time of discharge.    Discharge Date  8/7/2023    FOLLOW UP ITEMS POST DISCHARGE  Follow-up with [facility]    DISCHARGE DIAGNOSES  Principal Problem:    Acute CVA (cerebrovascular accident) (HCC) (POA: Yes)  Active Problems:    Type 2 diabetes mellitus (HCC) (POA: Yes)    Hyperlipidemia (POA: Yes)    Hypertension (POA: Yes)    Obesity (POA: Yes)  Resolved Problems:    * No resolved hospital  problems. *      FOLLOW UP  No future appointments.  [facility]  [id number]  [place] [address]  [phone]  Follow up        MEDICATIONS ON DISCHARGE     Medication List        START taking these medications        Instructions   atorvastatin 40 MG Tabs  Commonly known as: Lipitor   Take 1 Tablet by mouth every evening.  Dose: 40 mg            CONTINUE taking these medications        Instructions   aspirin 81 MG tablet   Take 81 mg by mouth every day.  Dose: 81 mg     glimepiride 4 MG Tabs  Commonly known as: Amaryl   Take 4 mg by mouth 2 times a day.  Dose: 4 mg     insulin detemir 100 UNIT/ML injection PEN  Commonly known as: Levemir   Inject 40 Units under the skin 2 times a day.  Dose: 40 Units     levothyroxine 175 MCG Tabs  Commonly known as: Synthroid   Take 1 Tablet by mouth every morning on an empty stomach.  Dose: 1 Tablet     liraglutide 18 MG/3ML Sopn  Commonly known as: Victoza   Inject 0.6 mg as instructed every day.  Dose: 0.6 mg     metFORMIN 500 MG Tabs  Commonly known as: Glucophage   Take 500 mg by mouth 2 times a day, with meals.  Dose: 500 mg     telmisartan 80 MG Tabs  Commonly known as: Micardis   Take 1 Tablet by mouth every day.  Dose: 1 Tablet     vitamin D3 1000 Unit (25 mcg) Tabs  Commonly known as: Cholecalciferol   Take 2,000 Units by mouth every day. 2 tablets = 2,000 units.  Dose: 2,000 Units              Allergies  Allergies   Allergen Reactions    Iodine Contrast [Diagn[employer]] Hives       DIET  No orders of the defined types were placed in this encounter.      ACTIVITY  As tolerated.  Weight bearing as tolerated    CONSULTATIONS  Neurology    PROCEDURES  None    LABORATORY  Lab Results   Component Value Date    SODIUM 137 08/07/2023    POTASSIUM 4.0 08/07/2023    CHLORIDE 99 08/07/2023    CO2 32 08/07/2023    GLUCOSE 175 (H) 08/07/2023    BUN 12 08/07/2023    CREATININE 0.64 08/07/2023        Lab Results   Component Value Date    WBC  9.9 08/07/2023    HEMOGLOBIN 14.1 08/07/2023    HEMATOCRIT 41.3 08/07/2023    PLATELETCT 267 08/07/2023        Total time of the discharge process exceeds 35 minutes.

## 2023-08-28 ENCOUNTER — TELEPHONE (OUTPATIENT)
Dept: CARDIOLOGY | Facility: MEDICAL CENTER | Age: 53
End: 2023-08-28
Payer: COMMERCIAL

## 2023-12-13 ENCOUNTER — OFFICE VISIT (OUTPATIENT)
Dept: NEUROLOGY | Facility: MEDICAL CENTER | Age: 53
End: 2023-12-13
Attending: PSYCHIATRY & NEUROLOGY
Payer: COMMERCIAL

## 2023-12-13 VITALS
BODY MASS INDEX: 37.15 KG/M2 | WEIGHT: 223 LBS | HEART RATE: 74 BPM | HEIGHT: 65 IN | DIASTOLIC BLOOD PRESSURE: 84 MMHG | OXYGEN SATURATION: 92 % | RESPIRATION RATE: 18 BRPM | TEMPERATURE: 96.7 F | SYSTOLIC BLOOD PRESSURE: 130 MMHG

## 2023-12-13 DIAGNOSIS — I63.9 ISCHEMIC STROKE (HCC): ICD-10-CM

## 2023-12-13 PROCEDURE — 3079F DIAST BP 80-89 MM HG: CPT | Performed by: PSYCHIATRY & NEUROLOGY

## 2023-12-13 PROCEDURE — 99212 OFFICE O/P EST SF 10 MIN: CPT | Performed by: PSYCHIATRY & NEUROLOGY

## 2023-12-13 PROCEDURE — 3075F SYST BP GE 130 - 139MM HG: CPT | Performed by: PSYCHIATRY & NEUROLOGY

## 2023-12-13 PROCEDURE — 99204 OFFICE O/P NEW MOD 45 MIN: CPT | Performed by: PSYCHIATRY & NEUROLOGY

## 2023-12-13 RX ORDER — EMPAGLIFLOZIN 10 MG/1
TABLET, FILM COATED ORAL
COMMUNITY
Start: 2023-10-17

## 2023-12-13 RX ORDER — CHLORTHALIDONE 25 MG/1
TABLET ORAL
COMMUNITY
Start: 2023-09-20

## 2023-12-13 RX ORDER — FLUCONAZOLE 150 MG/1
TABLET ORAL
COMMUNITY
Start: 2023-09-25

## 2023-12-13 NOTE — PROGRESS NOTES
Chief Complaint   Patient presents with    New Patient     Cerebral infarction       History of present illness:  Robyn Schaefer 53 y.o. female with DM2 and HL with history of left hemisphere embolic lacunar infarcts in 8/2023. She presented with weakness/numbness of the R hand which resolved after receiving TNK.   She is doing well currently without residual deficits.     Past medical history:   No past medical history on file.    Past surgical history:   No past surgical history on file.    Family history:   No family history on file.    Social history:   Social History     Socioeconomic History    Marital status:      Spouse name: Not on file    Number of children: Not on file    Years of education: Not on file    Highest education level: Not on file   Occupational History    Not on file   Tobacco Use    Smoking status: Never     Passive exposure: Never    Smokeless tobacco: Never   Vaping Use    Vaping Use: Never used   Substance and Sexual Activity    Alcohol use: Never    Drug use: Never    Sexual activity: Not on file   Other Topics Concern    Not on file   Social History Narrative    Not on file     Social Determinants of Health     Financial Resource Strain: Not on file   Food Insecurity: Not on file   Transportation Needs: Not on file   Physical Activity: Not on file   Stress: Not on file   Social Connections: Not on file   Intimate Partner Violence: Not on file   Housing Stability: Not on file       Current medications:   Current Outpatient Medications   Medication    chlorthalidone (HYGROTON) 25 MG Tab    JARDIANCE 10 MG Tab tablet    fluconazole (DIFLUCAN) 150 MG tablet    atorvastatin (LIPITOR) 40 MG Tab    levothyroxine (SYNTHROID) 175 MCG Tab    telmisartan (MICARDIS) 80 MG Tab    vitamin D3 (CHOLECALCIFEROL) 1000 Unit (25 mcg) Tab    insulin detemir (LEVEMIR) 100 UNIT/ML injection PEN    aspirin 81 MG tablet    metformin (GLUCOPHAGE) 500 MG Tab    liraglutide (VICTOZA) 18 MG/3ML Solution  "Pen-injector injection    glimepiride (AMARYL) 4 MG Tab     No current facility-administered medications for this visit.       Medication Allergy:  Allergies   Allergen Reactions    Iodine Contrast [Diagnostic X-Ray Materials] Hives       Physical examination:   Vitals:    12/13/23 1513   BP: 130/84   BP Location: Left arm   Patient Position: Sitting   BP Cuff Size: Adult   Pulse: 74   Resp: 18   Temp: 35.9 °C (96.7 °F)   TempSrc: Temporal   SpO2: 92%   Weight: 101 kg (223 lb)   Height: 1.651 m (5' 5\")     Labs:  I reviewed the following labs personally:  Lab Results   Component Value Date/Time    CHOLSTRLTOT 113 08/05/2023 0345    TRIGLYCERIDE 79 08/05/2023 0345    HDL 42 08/05/2023 0345    LDL 55 08/05/2023 0345     Lab Results   Component Value Date/Time    HBA1C 10.6 (H) 08/05/2023 03:45 AM      Imaging:   MRI BRAIN w/o   I reviewed the images personally and agree with the following read:     IMPRESSION:     1.  Several punctate foci of acute infarction involving the cortex in the left posterior frontal and parietal lobes    MRA NECK   FINDINGS:  The common carotid arteries have a normal caliber and course.  There is normal bifurcation into internal and external carotid arteries.  The internal carotid arteries have a normal caliber course and appearance.  The external carotid arteries are within   normal limits.     The vertebral arteries have a normal caliber and course.  There is a normal vertebral basilar junction.  The basilar artery has a normal appearance.        IMPRESSION:     1.  Normal MR angiogram of the carotid arteries and vertebral basilar system..    Transthoracic  Echo Report        Echocardiography Laboratory     CONCLUSIONS  Technically difficult due to body habitus but adequate study for   interpretation.   Agitated saline study was performed.  Normal left and right ventricular systolic function.  Visually   estimated LVEF of 55-60%.   No evidence of interatrial shunt by agitated saline bubble " study.  No significant valvular stenosis or regurgitation.   No prior echo for comparison.    Procedure: ZioAT for 11 days beginning 8/7/2023   DOS: 8/28/2023     Ziopatch  Findings:   Underlying rhythm: Sinus   The average heart rate is 77 BPM, and ranges from  BPM     Atrial events: Rare. No atrial fibrillation     Ventricular events: Rare. No ventricular tachycardia     Pauses, bradyarrhythmia or heart block: None     Patient events: 14 events were submitted for review.  Events correspond predominantly to sinus rhythm, rarely to premature atrial contractions.     Impressions:   Symptomatic monitor, symptoms correspond predominantly to sinus rhythm, rarely with PACs   No significant arrhythmia     ASSESSMENT AND PLAN:  Problem List Items Addressed This Visit       Ischemic stroke (HCC)    Relevant Medications    chlorthalidone (HYGROTON) 25 MG Tab       1. Ischemic stroke (HCC)    Other orders  - chlorthalidone (HYGROTON) 25 MG Tab  - JARDIANCE 10 MG Tab tablet  - fluconazole (DIFLUCAN) 150 MG tablet    Presumed Mechanism by TOAST  __  Large-artery atherosclerosis  __  Cardioembolism  __  Small-vessel occlusion  __  Stroke of other determined etiology   x__  Stroke of undetermined etiology     Antithrombotic: ASA 81mg daily  Blood pressure goal: < 140/90  LDL goal: <100 ( at goal), no need for statin     MRI brain demonstrates scattered embolic lacunar infarcts over the LEFT MCA territory. I have counseled the patient that due to cryptogenic embolic stroke, I recommend that she receive an implantable loop recorder for long-term monitoring for atrial fibrillation - this is done by CARDIOLOGY and she would need a cardiology referral. Note is going to be sent to her primary doctor to order the referral.    FOLLOW-UP:   No follow-ups on file.    Total time spent for the day for this patient unrelated to procedure time is: 28 minutes. I spent 16 minutes in face to face time and I spent 7 minutes pre-charting and  5 minutes in post-visit documentation.      Dr. Piotr Helton D.O.  [facility]

## 2024-06-28 ENCOUNTER — OFFICE (OUTPATIENT)
Dept: URBAN - METROPOLITAN AREA CLINIC 106 | Facility: CLINIC | Age: 54
End: 2024-06-28

## 2024-06-28 DIAGNOSIS — R11.0 NAUSEA: ICD-10-CM

## 2024-06-28 DIAGNOSIS — K59.00 CONSTIPATION: ICD-10-CM

## 2024-06-28 DIAGNOSIS — K21.9 GERD: ICD-10-CM

## 2024-06-28 DIAGNOSIS — R19.7 DIARRHEA (UNSPECIFIED): ICD-10-CM

## 2024-06-28 DIAGNOSIS — R10.30 ABDOMINAL PAIN, OTHER OR MULTIPLE SITES: ICD-10-CM

## 2024-06-28 DIAGNOSIS — R13.10 DYSPHAGIA: ICD-10-CM

## 2024-06-28 DIAGNOSIS — Z12.11 SCREENING FOR COLON CANCER: ICD-10-CM

## 2024-06-28 DIAGNOSIS — R10.12 LUQ PAIN: ICD-10-CM

## 2024-06-28 PROCEDURE — 99214 OFFICE O/P EST MOD 30 MIN: CPT | Performed by: INTERNAL MEDICINE

## 2024-06-28 PROCEDURE — G0406 INPT/TELE FOLLOW UP 15: HCPCS | Performed by: INTERNAL MEDICINE

## 2024-06-28 NOTE — SERVICEHPINOTES
The patient is scheduled today for  telehealth visit.The clinician is connected to a secure network. The patient consents to this teleconference being a billable service. This visit used Doxy for a live, interactive audio and video telehealth visit.  Appointment was approximately 15 minutes long. The patient is seen for the evaluation of suspected GERD.    Noted the onset of   heartburn, epigastric pain and dysphagia  several  months   ago  .   Symptoms have been occurring   several   time(s) per   hours  .    During a given day, they are most prevalent   shortly after eating meals  .    They are exacerbated by   large meals  .   In the past, the patient has tried   OTC antacids  .   Currently takes   OTC antacids   dosed   every day  .   On this therapy, symptom response has been   no  .    Associated symptoms include   dysphagia, epigastric pain and LUQ pain  .      Has also noted atypical (non-esophageal) symptoms including   .    A prior EGD has been performed and showed   mild reflux esophagitis  .

## 2024-12-30 NOTE — PATIENT INSTRUCTIONS
Continue aspirin 81mg daily.     You can stop the atorvastatin.     I would like for you to be referred to cardiology for a loop recorder.   
4 = No assist / stand by assistance

## 2025-04-29 ENCOUNTER — HOSPITAL ENCOUNTER (OUTPATIENT)
Facility: MEDICAL CENTER | Age: 55
End: 2025-04-29
Attending: STUDENT IN AN ORGANIZED HEALTH CARE EDUCATION/TRAINING PROGRAM | Admitting: STUDENT IN AN ORGANIZED HEALTH CARE EDUCATION/TRAINING PROGRAM
Payer: COMMERCIAL

## 2025-05-08 ENCOUNTER — APPOINTMENT (OUTPATIENT)
Dept: ADMISSIONS | Facility: MEDICAL CENTER | Age: 55
End: 2025-05-08
Attending: STUDENT IN AN ORGANIZED HEALTH CARE EDUCATION/TRAINING PROGRAM
Payer: COMMERCIAL

## 2025-05-16 ENCOUNTER — PRE-ADMISSION TESTING (OUTPATIENT)
Dept: ADMISSIONS | Facility: MEDICAL CENTER | Age: 55
End: 2025-05-16
Attending: STUDENT IN AN ORGANIZED HEALTH CARE EDUCATION/TRAINING PROGRAM
Payer: COMMERCIAL

## 2025-05-16 RX ORDER — TIRZEPATIDE 10 MG/.5ML
10 INJECTION, SOLUTION SUBCUTANEOUS
COMMUNITY
Start: 2025-04-30

## 2025-05-16 RX ORDER — INSULIN GLARGINE 100 [IU]/ML
40 INJECTION, SOLUTION SUBCUTANEOUS 2 TIMES DAILY
COMMUNITY

## 2025-05-16 NOTE — OR NURSING
Pre-Admit RN appointment completed for 5/30/25. Copy of Medication Reconciliation with instructions provided to pt via [employer]. Pt planning on having testing performed at [facility].

## 2025-05-16 NOTE — PREADMIT AVS NOTE
Current Medications   Medication Instructions    insulin glargine 100 UNIT/ML Solution Pen-injector injection Follow instructions from surgeon or specialist.    MOUNJARO 10 MG/0.5ML Solution Auto-injector Stop 7 days before surgery    chlorthalidone (HYGROTON) 25 MG Tab Hold medication day of procedure    JARDIANCE 10 MG Tab tablet Stop 4 days before surgery    atorvastatin (LIPITOR) 40 MG Tab Continue taking as prescribed.    levothyroxine (SYNTHROID) 175 MCG Tab Continue taking as prescribed.    telmisartan (MICARDIS) 80 MG Tab Stop 24 hours before surgery    vitamin D3 (CHOLECALCIFEROL) 1000 Unit (25 mcg) Tab Stop 7 days before surgery    aspirin 81 MG tablet Follow instructions from surgeon or specialist.    metformin (GLUCOPHAGE) 500 MG Tab Hold medication day of procedure    glimepiride (AMARYL) 4 MG Tab Hold medication day of procedure

## 2025-05-19 NOTE — OR NURSING
Patient called in wanting to come to preadmit to complete preop testing instead of going to [facility].  Appt scheduled for 5/23/2025 @ [id number].

## 2025-05-23 ENCOUNTER — PRE-ADMISSION TESTING (OUTPATIENT)
Dept: ADMISSIONS | Facility: MEDICAL CENTER | Age: 55
End: 2025-05-23
Attending: STUDENT IN AN ORGANIZED HEALTH CARE EDUCATION/TRAINING PROGRAM
Payer: COMMERCIAL

## 2025-05-23 ENCOUNTER — HOSPITAL ENCOUNTER (OUTPATIENT)
Dept: RADIOLOGY | Facility: MEDICAL CENTER | Age: 55
End: 2025-05-23
Attending: STUDENT IN AN ORGANIZED HEALTH CARE EDUCATION/TRAINING PROGRAM | Admitting: STUDENT IN AN ORGANIZED HEALTH CARE EDUCATION/TRAINING PROGRAM
Payer: COMMERCIAL

## 2025-05-23 DIAGNOSIS — Z01.810 PRE-OPERATIVE CARDIOVASCULAR EXAMINATION: ICD-10-CM

## 2025-05-23 DIAGNOSIS — Z01.811 PRE-OPERATIVE RESPIRATORY EXAMINATION: ICD-10-CM

## 2025-05-23 DIAGNOSIS — Z01.812 PRE-OPERATIVE LABORATORY EXAMINATION: ICD-10-CM

## 2025-05-23 LAB
ABO GROUP BLD: NORMAL
ALBUMIN SERPL BCP-MCNC: 3.9 G/DL (ref 3.2–4.9)
ALBUMIN/GLOB SERPL: 1.1 G/DL
ALP SERPL-CCNC: 111 U/L (ref 30–99)
ALT SERPL-CCNC: 11 U/L (ref 2–50)
ANION GAP SERPL CALC-SCNC: 12 MMOL/L (ref 7–16)
AST SERPL-CCNC: 18 U/L (ref 12–45)
BASOPHILS # BLD AUTO: 0.9 % (ref 0–1.8)
BASOPHILS # BLD: 0.08 K/UL (ref 0–0.12)
BILIRUB SERPL-MCNC: 0.4 MG/DL (ref 0.1–1.5)
BLD GP AB SCN SERPL QL: NORMAL
BUN SERPL-MCNC: 17 MG/DL (ref 8–22)
CALCIUM ALBUM COR SERPL-MCNC: 9.3 MG/DL (ref 8.5–10.5)
CALCIUM SERPL-MCNC: 9.2 MG/DL (ref 8.5–10.5)
CHLORIDE SERPL-SCNC: 101 MMOL/L (ref 96–112)
CO2 SERPL-SCNC: 25 MMOL/L (ref 20–33)
CREAT SERPL-MCNC: 0.67 MG/DL (ref 0.5–1.4)
EKG IMPRESSION: NORMAL
EOSINOPHIL # BLD AUTO: 0.3 K/UL (ref 0–0.51)
EOSINOPHIL NFR BLD: 3.4 % (ref 0–6.9)
ERYTHROCYTE [DISTWIDTH] IN BLOOD BY AUTOMATED COUNT: 42.9 FL (ref 35.9–50)
EST. AVERAGE GLUCOSE BLD GHB EST-MCNC: 214 MG/DL
GFR SERPLBLD CREATININE-BSD FMLA CKD-EPI: 104 ML/MIN/1.73 M 2
GLOBULIN SER CALC-MCNC: 3.5 G/DL (ref 1.9–3.5)
GLUCOSE SERPL-MCNC: 195 MG/DL (ref 65–99)
HBA1C MFR BLD: 9.1 % (ref 4–5.6)
HCT VFR BLD AUTO: 44.9 % (ref 37–47)
HGB BLD-MCNC: 14.9 G/DL (ref 12–16)
IMM GRANULOCYTES # BLD AUTO: 0.03 K/UL (ref 0–0.11)
IMM GRANULOCYTES NFR BLD AUTO: 0.3 % (ref 0–0.9)
LYMPHOCYTES # BLD AUTO: 3.01 K/UL (ref 1–4.8)
LYMPHOCYTES NFR BLD: 34.2 % (ref 22–41)
MCH RBC QN AUTO: 29.4 PG (ref 27–33)
MCHC RBC AUTO-ENTMCNC: 33.2 G/DL (ref 32.2–35.5)
MCV RBC AUTO: 88.7 FL (ref 81.4–97.8)
MONOCYTES # BLD AUTO: 0.47 K/UL (ref 0–0.85)
MONOCYTES NFR BLD AUTO: 5.3 % (ref 0–13.4)
NEUTROPHILS # BLD AUTO: 4.92 K/UL (ref 1.82–7.42)
NEUTROPHILS NFR BLD: 55.9 % (ref 44–72)
NRBC # BLD AUTO: 0 K/UL
NRBC BLD-RTO: 0 /100 WBC (ref 0–0.2)
PLATELET # BLD AUTO: 270 K/UL (ref 164–446)
PMV BLD AUTO: 10.2 FL (ref 9–12.9)
POTASSIUM SERPL-SCNC: 4 MMOL/L (ref 3.6–5.5)
PROT SERPL-MCNC: 7.4 G/DL (ref 6–8.2)
RBC # BLD AUTO: 5.06 M/UL (ref 4.2–5.4)
RH BLD: NORMAL
SODIUM SERPL-SCNC: 138 MMOL/L (ref 135–145)
WBC # BLD AUTO: 8.8 K/UL (ref 4.8–10.8)

## 2025-05-23 PROCEDURE — 80053 COMPREHEN METABOLIC PANEL: CPT

## 2025-05-23 PROCEDURE — 86900 BLOOD TYPING SEROLOGIC ABO: CPT

## 2025-05-23 PROCEDURE — 86901 BLOOD TYPING SEROLOGIC RH(D): CPT

## 2025-05-23 PROCEDURE — 85025 COMPLETE CBC W/AUTO DIFF WBC: CPT

## 2025-05-23 PROCEDURE — 83036 HEMOGLOBIN GLYCOSYLATED A1C: CPT

## 2025-05-23 PROCEDURE — 86850 RBC ANTIBODY SCREEN: CPT

## 2025-05-23 PROCEDURE — 36415 COLL VENOUS BLD VENIPUNCTURE: CPT

## 2025-05-23 PROCEDURE — 71045 X-RAY EXAM CHEST 1 VIEW: CPT

## 2025-05-23 PROCEDURE — 93005 ELECTROCARDIOGRAM TRACING: CPT | Mod: TC

## 2025-05-23 PROCEDURE — 93010 ELECTROCARDIOGRAM REPORT: CPT | Performed by: INTERNAL MEDICINE
